# Patient Record
Sex: FEMALE | Race: BLACK OR AFRICAN AMERICAN | NOT HISPANIC OR LATINO
[De-identification: names, ages, dates, MRNs, and addresses within clinical notes are randomized per-mention and may not be internally consistent; named-entity substitution may affect disease eponyms.]

---

## 2021-02-27 ENCOUNTER — TRANSCRIPTION ENCOUNTER (OUTPATIENT)
Age: 66
End: 2021-02-27

## 2021-02-27 ENCOUNTER — INPATIENT (INPATIENT)
Facility: HOSPITAL | Age: 66
LOS: 10 days | Discharge: HOME | DRG: 177 | End: 2021-03-10
Attending: FAMILY MEDICINE | Admitting: HOSPITALIST
Payer: MEDICARE

## 2021-02-27 VITALS
WEIGHT: 179.9 LBS | OXYGEN SATURATION: 93 % | TEMPERATURE: 102 F | RESPIRATION RATE: 20 BRPM | HEART RATE: 106 BPM | HEIGHT: 65 IN | DIASTOLIC BLOOD PRESSURE: 101 MMHG | SYSTOLIC BLOOD PRESSURE: 189 MMHG

## 2021-02-27 DIAGNOSIS — R50.9 FEVER, UNSPECIFIED: ICD-10-CM

## 2021-02-27 LAB
ALBUMIN SERPL ELPH-MCNC: 3.7 G/DL — SIGNIFICANT CHANGE UP (ref 3.3–5.2)
ALP SERPL-CCNC: 65 U/L — SIGNIFICANT CHANGE UP (ref 40–120)
ALT FLD-CCNC: 62 U/L — HIGH
ANION GAP SERPL CALC-SCNC: 2 MMOL/L — LOW (ref 5–17)
APPEARANCE UR: CLEAR — SIGNIFICANT CHANGE UP
AST SERPL-CCNC: 52 U/L — HIGH
B PERT DNA SPEC QL NAA+PROBE: SIGNIFICANT CHANGE UP
BACTERIA # UR AUTO: ABNORMAL
BASOPHILS # BLD AUTO: 0 K/UL — SIGNIFICANT CHANGE UP (ref 0–0.2)
BASOPHILS NFR BLD AUTO: 0 % — SIGNIFICANT CHANGE UP (ref 0–2)
BILIRUB SERPL-MCNC: 0.3 MG/DL — LOW (ref 0.4–2)
BILIRUB UR-MCNC: NEGATIVE — SIGNIFICANT CHANGE UP
BUN SERPL-MCNC: 9 MG/DL — SIGNIFICANT CHANGE UP (ref 8–20)
BURR CELLS BLD QL SMEAR: PRESENT — SIGNIFICANT CHANGE UP
C PNEUM DNA SPEC QL NAA+PROBE: SIGNIFICANT CHANGE UP
CALCIUM SERPL-MCNC: 8.9 MG/DL — SIGNIFICANT CHANGE UP (ref 8.6–10.2)
CHLORIDE SERPL-SCNC: 106 MMOL/L — SIGNIFICANT CHANGE UP (ref 98–107)
CO2 SERPL-SCNC: 22 MMOL/L — SIGNIFICANT CHANGE UP (ref 22–29)
COLOR SPEC: YELLOW — SIGNIFICANT CHANGE UP
CREAT SERPL-MCNC: 0.86 MG/DL — SIGNIFICANT CHANGE UP (ref 0.5–1.3)
CRP SERPL-MCNC: 10.71 MG/DL — HIGH (ref 0–0.4)
D DIMER BLD IA.RAPID-MCNC: 165 NG/ML DDU — SIGNIFICANT CHANGE UP
DACRYOCYTES BLD QL SMEAR: SIGNIFICANT CHANGE UP
DIFF PNL FLD: ABNORMAL
ELLIPTOCYTES BLD QL SMEAR: SLIGHT — SIGNIFICANT CHANGE UP
EOSINOPHIL # BLD AUTO: 0 K/UL — SIGNIFICANT CHANGE UP (ref 0–0.5)
EOSINOPHIL NFR BLD AUTO: 0 % — SIGNIFICANT CHANGE UP (ref 0–6)
EPI CELLS # UR: ABNORMAL
FERRITIN SERPL-MCNC: 298 NG/ML — HIGH (ref 15–150)
FLUAV H1 2009 PAND RNA SPEC QL NAA+PROBE: SIGNIFICANT CHANGE UP
FLUAV H1 RNA SPEC QL NAA+PROBE: SIGNIFICANT CHANGE UP
FLUAV H3 RNA SPEC QL NAA+PROBE: SIGNIFICANT CHANGE UP
FLUAV SUBTYP SPEC NAA+PROBE: SIGNIFICANT CHANGE UP
FLUBV RNA SPEC QL NAA+PROBE: SIGNIFICANT CHANGE UP
GLUCOSE SERPL-MCNC: 169 MG/DL — HIGH (ref 70–99)
GLUCOSE UR QL: NEGATIVE MG/DL — SIGNIFICANT CHANGE UP
GRAN CASTS # UR COMP ASSIST: ABNORMAL /LPF
HADV DNA SPEC QL NAA+PROBE: SIGNIFICANT CHANGE UP
HCOV PNL SPEC NAA+PROBE: SIGNIFICANT CHANGE UP
HCT VFR BLD CALC: 39.5 % — SIGNIFICANT CHANGE UP (ref 34.5–45)
HGB BLD-MCNC: 12.9 G/DL — SIGNIFICANT CHANGE UP (ref 11.5–15.5)
HMPV RNA SPEC QL NAA+PROBE: SIGNIFICANT CHANGE UP
HPIV1 RNA SPEC QL NAA+PROBE: SIGNIFICANT CHANGE UP
HPIV2 RNA SPEC QL NAA+PROBE: SIGNIFICANT CHANGE UP
HPIV3 RNA SPEC QL NAA+PROBE: SIGNIFICANT CHANGE UP
HPIV4 RNA SPEC QL NAA+PROBE: SIGNIFICANT CHANGE UP
HYALINE CASTS # UR AUTO: ABNORMAL /LPF
KETONES UR-MCNC: ABNORMAL
LEUKOCYTE ESTERASE UR-ACNC: ABNORMAL
LYMPHOCYTES # BLD AUTO: 0.58 K/UL — LOW (ref 1–3.3)
LYMPHOCYTES # BLD AUTO: 9 % — LOW (ref 13–44)
MANUAL SMEAR VERIFICATION: SIGNIFICANT CHANGE UP
MCHC RBC-ENTMCNC: 26.1 PG — LOW (ref 27–34)
MCHC RBC-ENTMCNC: 32.7 GM/DL — SIGNIFICANT CHANGE UP (ref 32–36)
MCV RBC AUTO: 80 FL — SIGNIFICANT CHANGE UP (ref 80–100)
METAMYELOCYTES # FLD: 0.9 % — HIGH (ref 0–0)
MONOCYTES # BLD AUTO: 0.23 K/UL — SIGNIFICANT CHANGE UP (ref 0–0.9)
MONOCYTES NFR BLD AUTO: 3.6 % — SIGNIFICANT CHANGE UP (ref 2–14)
MYELOCYTES NFR BLD: 0.9 % — HIGH (ref 0–0)
NEUTROPHILS # BLD AUTO: 5.5 K/UL — SIGNIFICANT CHANGE UP (ref 1.8–7.4)
NEUTROPHILS NFR BLD AUTO: 82.9 % — HIGH (ref 43–77)
NEUTS BAND # BLD: 2.7 % — SIGNIFICANT CHANGE UP (ref 0–8)
NITRITE UR-MCNC: NEGATIVE — SIGNIFICANT CHANGE UP
OVALOCYTES BLD QL SMEAR: SLIGHT — SIGNIFICANT CHANGE UP
PH UR: 5 — SIGNIFICANT CHANGE UP (ref 5–8)
PLAT MORPH BLD: NORMAL — SIGNIFICANT CHANGE UP
PLATELET # BLD AUTO: 218 K/UL — SIGNIFICANT CHANGE UP (ref 150–400)
POIKILOCYTOSIS BLD QL AUTO: SIGNIFICANT CHANGE UP
POLYCHROMASIA BLD QL SMEAR: SLIGHT — SIGNIFICANT CHANGE UP
POTASSIUM SERPL-MCNC: 3.7 MMOL/L — SIGNIFICANT CHANGE UP (ref 3.5–5.3)
POTASSIUM SERPL-SCNC: 3.7 MMOL/L — SIGNIFICANT CHANGE UP (ref 3.5–5.3)
PROCALCITONIN SERPL-MCNC: 0.36 NG/ML — HIGH (ref 0.02–0.1)
PROT SERPL-MCNC: 7.7 G/DL — SIGNIFICANT CHANGE UP (ref 6.6–8.7)
PROT UR-MCNC: 500 MG/DL
RAPID RVP RESULT: DETECTED
RBC # BLD: 4.94 M/UL — SIGNIFICANT CHANGE UP (ref 3.8–5.2)
RBC # FLD: 13.7 % — SIGNIFICANT CHANGE UP (ref 10.3–14.5)
RBC BLD AUTO: ABNORMAL
RBC CASTS # UR COMP ASSIST: ABNORMAL /HPF (ref 0–4)
RV+EV RNA SPEC QL NAA+PROBE: SIGNIFICANT CHANGE UP
SARS-COV-2 RNA SPEC QL NAA+PROBE: DETECTED
SMUDGE CELLS # BLD: PRESENT — SIGNIFICANT CHANGE UP
SODIUM SERPL-SCNC: 129 MMOL/L — LOW (ref 135–145)
SP GR SPEC: 1.01 — SIGNIFICANT CHANGE UP (ref 1.01–1.02)
TROPONIN T SERPL-MCNC: <0.01 NG/ML — SIGNIFICANT CHANGE UP (ref 0–0.06)
UROBILINOGEN FLD QL: NEGATIVE MG/DL — SIGNIFICANT CHANGE UP
WBC # BLD: 6.42 K/UL — SIGNIFICANT CHANGE UP (ref 3.8–10.5)
WBC # FLD AUTO: 6.42 K/UL — SIGNIFICANT CHANGE UP (ref 3.8–10.5)
WBC UR QL: ABNORMAL

## 2021-02-27 PROCEDURE — 93010 ELECTROCARDIOGRAM REPORT: CPT

## 2021-02-27 PROCEDURE — 71045 X-RAY EXAM CHEST 1 VIEW: CPT | Mod: 26

## 2021-02-27 PROCEDURE — 99223 1ST HOSP IP/OBS HIGH 75: CPT | Mod: CS

## 2021-02-27 PROCEDURE — 99285 EMERGENCY DEPT VISIT HI MDM: CPT | Mod: CS,GC

## 2021-02-27 RX ORDER — SODIUM CHLORIDE 9 MG/ML
2500 INJECTION INTRAMUSCULAR; INTRAVENOUS; SUBCUTANEOUS ONCE
Refills: 0 | Status: DISCONTINUED | OUTPATIENT
Start: 2021-02-27 | End: 2021-02-27

## 2021-02-27 RX ORDER — ACETAMINOPHEN 500 MG
650 TABLET ORAL ONCE
Refills: 0 | Status: COMPLETED | OUTPATIENT
Start: 2021-02-27 | End: 2021-02-27

## 2021-02-27 RX ORDER — SODIUM CHLORIDE 9 MG/ML
1000 INJECTION, SOLUTION INTRAVENOUS ONCE
Refills: 0 | Status: COMPLETED | OUTPATIENT
Start: 2021-02-27 | End: 2021-02-27

## 2021-02-27 RX ORDER — CEPHALEXIN 500 MG
1 CAPSULE ORAL
Qty: 21 | Refills: 0
Start: 2021-02-27 | End: 2021-03-05

## 2021-02-27 RX ORDER — SODIUM CHLORIDE 9 MG/ML
1000 INJECTION INTRAMUSCULAR; INTRAVENOUS; SUBCUTANEOUS ONCE
Refills: 0 | Status: COMPLETED | OUTPATIENT
Start: 2021-02-27 | End: 2021-02-27

## 2021-02-27 RX ORDER — ALBUTEROL 90 UG/1
2 AEROSOL, METERED ORAL
Qty: 1 | Refills: 0
Start: 2021-02-27 | End: 2021-03-06

## 2021-02-27 RX ADMIN — SODIUM CHLORIDE 1000 MILLILITER(S): 9 INJECTION, SOLUTION INTRAVENOUS at 18:06

## 2021-02-27 RX ADMIN — Medication 650 MILLIGRAM(S): at 18:00

## 2021-02-27 RX ADMIN — SODIUM CHLORIDE 1000 MILLILITER(S): 9 INJECTION, SOLUTION INTRAVENOUS at 19:34

## 2021-02-27 RX ADMIN — SODIUM CHLORIDE 1000 MILLILITER(S): 9 INJECTION INTRAMUSCULAR; INTRAVENOUS; SUBCUTANEOUS at 21:19

## 2021-02-27 NOTE — ED ADULT NURSE NOTE - OBJECTIVE STATEMENT
pt alert and oriented x3 comes in c/o flu like symptoms, decreased PO intake x4-5 days with body aches. pt states  covid+. RR even unlabored. pt denies chest pain, 94% oNRA. pt educated on plan of care, pt able to successfully teach back plan of care to RN, RN will continue to reeducate pt during hospital stay.

## 2021-02-27 NOTE — H&P ADULT - HISTORY OF PRESENT ILLNESS
65F hx HTN, prediabetes p/w fever, cough and body aches. Patient states started feeling sick 5 days ago. She's had body aches, fevers, headache and generalized weakness. She's also had decreased po intake. Her  was admitted here yesterday for covid19. She denies sob, nausea, vomiting, diarrhea, chest pain or abdominal pain. Patient denies dysuria or urinary frequency.     In ER, pt tmax 100, pulse 88 bp 182, 85, RR- 20 and spo2 initially 95 on room air. Patient was given 2L in bolus fluid. Initially planning to be discharged, but then hypoxia worsned to 92 at rest and 91 with ambulation.

## 2021-02-27 NOTE — ED ADULT NURSE REASSESSMENT NOTE - NS ED NURSE REASSESS COMMENT FT1
Report received from YRN Hardwick at 1915, pt care assumed at that time. Pt received awake, A&Ox4, respirations appearing even, unlabored, pt denies SOB or chest pain. Peripheral pulses palpable with capillary refill <3 seconds. Cardiac monitor in place showing NSR HR 80s,  in place showing Pulse ox > 93% on room air. Pt reporting body aches, offering no other complaints at this time. 20g PIV in L AC with dressing CDI. Pt with no apparent acute distress observed at this time, safety maintained, will continue to monitor.

## 2021-02-27 NOTE — ED PROVIDER NOTE - NS ED ROS FT
General: +fever, no massive bleeding  Head: No HA, no ongoing scalp bleed  ENT: no neck pain, no sore throat  Resp: No SOB, no hemoptysis  Cardiovascular: No CP, No LOC  GI: No abdominal pain, No blood in stool  : No dysuria, no hematuria   MSK: No back pain, +limb pain  Skin: No painful or bleeding lesions  Neuro: No paresthesias, No focal deficits

## 2021-02-27 NOTE — ED ADULT TRIAGE NOTE - CHIEF COMPLAINT QUOTE
pt son reports pt with trouble eating and pain to entire body x5 days.  tested + yesterday for covid

## 2021-02-27 NOTE — H&P ADULT - NSHPREVIEWOFSYSTEMS_GEN_ALL_CORE
REVIEW OF SYSTEMS:    CONSTITUTIONAL: see HPI  EYES/ENT: No visual changes;  No vertigo or throat pain   NECK: No pain or stiffness  RESPIRATORY: No cough, wheezing, hemoptysis; No shortness of breath  CARDIOVASCULAR: No chest pain or palpitations  GASTROINTESTINAL: No abdominal or epigastric pain. No nausea, vomiting, or hematemesis; No diarrhea or constipation. No melena or hematochezia. +anorexia  GENITOURINARY: No dysuria, frequency or hematuria  NEUROLOGICAL: No numbness or weakness  SKIN: No itching, burning, rashes, or lesions   All other review of systems is negative unless indicated above.

## 2021-02-27 NOTE — H&P ADULT - NSHPPHYSICALEXAM_GEN_ALL_CORE
Vital Signs Last 24 Hrs  T(C): 37.8 (27 Feb 2021 22:14), Max: 39 (27 Feb 2021 16:35)  T(F): 100 (27 Feb 2021 22:14), Max: 102.2 (27 Feb 2021 16:35)  HR: 88 (28 Feb 2021 00:04) (86 - 106)  BP: 186/95 (28 Feb 2021 00:04) (181/88 - 189/101)  BP(mean): --  RR: 20 (27 Feb 2021 22:14) (20 - 20)  SpO2: 92% (27 Feb 2021 22:14) (92% - 95%)    GENERAL: NAD, appears stated age  HEENT:  Atraumatic, Normocephalic, MMM, no oropharyngeal lesions  EYES: EOMI, PERRLA, conjunctiva and sclera clear  NECK: Supple, No JVD, no throat tenderness.  CHEST/LUNG: rales at lung bases, no wheeze or rhonchi  HEART: Regular rate and rhythm; No murmurs, rubs, or gallops  ABDOMEN: Soft, Nontender, Nondistended; Bowel sounds present  EXTREMITIES:  2+ Peripheral Pulses, No clubbing, cyanosis, or edema  PSYCH: AAOx3, normal affect  NEUROLOGY: moves all extremities spontaneously. no sensory deficits  SKIN: No rashes or lesions

## 2021-02-27 NOTE — ED ADULT NURSE NOTE - CHIEF COMPLAINT QUOTE
pt son reports pt with trouble eating and pain to entire body x5 days.  tested + yesterday for covid
No

## 2021-02-27 NOTE — ED PROVIDER NOTE - ATTENDING CONTRIBUTION TO CARE
I, Tai Costa, performed a face to face bedside interview with this patient regarding history of present illness, review of symptoms and relevant past medical, social and family history.  I completed an independent physical examination. I have communicated the patient’s plan of care and disposition with the resident.  65 year old female presents with 5 doran of fever and diffuse body aches. She denies cough, SOB, chest pain, abd pain, nausea, vomiting, congestion, headache, neck stiffness, dysuria.   Gen: NAD, well appearing  CV: RRR  Pul: CTA b/l  Abd: Soft, non-distended, non-tender  Neuro: no focal deficits  Pt improved, well appearing and states hat she feels much better, equivocal uti which we will tx, suspect covid, stable for dc and advised home quaratine I, Tai Costa, performed a face to face bedside interview with this patient regarding history of present illness, review of symptoms and relevant past medical, social and family history.  I completed an independent physical examination. I have communicated the patient’s plan of care and disposition with the resident.  65 year old female presents with 5 doran of fever and diffuse body aches. She denies cough, SOB, chest pain, abd pain, nausea, vomiting, congestion, headache, neck stiffness, dysuria.   Gen: NAD, well appearing  CV: RRR  Pul: CTA b/l  Abd: Soft, non-distended, non-tender  Neuro: no focal deficits  Pt with covid infection. States she was feeling better but pulse ox has been continuously dropping while here in dept, 92% on RA and 91% with ambulation. Will admit.

## 2021-02-27 NOTE — ED PROVIDER NOTE - CARE PLAN
Principal Discharge DX:	Febrile illness  Secondary Diagnosis:	Suspected 2019-nCoV infection  Secondary Diagnosis:	Acute cystitis without hematuria

## 2021-02-27 NOTE — ED ADULT NURSE NOTE - NSIMPLEMENTINTERV_GEN_ALL_ED
Implemented All Fall Risk Interventions:  [place] to call system. Call bell, personal items and telephone within reach. Instruct patient to call for assistance. Room bathroom lighting operational. Non-slip footwear when patient is off stretcher. Physically safe environment: no spills, clutter or unnecessary equipment. Stretcher in lowest position, wheels locked, appropriate side rails in place. Provide visual cue, wrist band, yellow gown, etc. Monitor gait and stability. Monitor for mental status changes and reorient to person, place, and time. Review medications for side effects contributing to fall risk. Reinforce activity limits and safety measures with patient and family.

## 2021-02-27 NOTE — H&P ADULT - NSICDXFAMILYHX_GEN_ALL_CORE_FT
Called patient to discuss pelvic US.   Patient was scheduled for pelvic MRI, patient responsible for $1000 deductible. She does not need pelvic US also. Patient scheduled to see me on August 28th at 3:30 p.m. to discuss results and to make management decisions.   No pertinent family history in first degree relatives

## 2021-02-27 NOTE — ED PROVIDER NOTE - PATIENT PORTAL LINK FT
You can access the FollowMyHealth Patient Portal offered by [facility] by registering at the following website: [email or web address]. By joining [employer]’s FollowMyHealth portal, you will also be able to view your health information using other applications (apps) compatible with our system.

## 2021-02-27 NOTE — ED PROVIDER NOTE - PROGRESS NOTE DETAILS
Patient takes amlodipine 10mg at home for BP. Patient found to be COVID +, O2 sat went down to 92-91 on RA.  Decision was made to admit patient for further monitoring and management of COVID.

## 2021-02-27 NOTE — H&P ADULT - ASSESSMENT
65F hx HTN, prediabetes p/w fever, cough and body aches found to be hypoxic 2/2 covid19. 65F hx HTN, prediabetes p/w fever, cough and body aches found to be hypoxic 2/2 covid19.      #Hypoxia 2/2 covid19  -will start on dexamethasone 6 mg iv  -admit to monitored bed with   -likely component of fluids given in ER with high blood pressure  -bp control  -will give lasix 20 x1  -monitor i's and o's  -if hypoxia worsens consider remdesivir  -unlikely PE given normal d-dimer    #Hyponatremia  -likely 2/2 decreased po intake  -s/p 2L in ER  -will check repeat in am, goal correction 8-10 meq in 24 hours  -hold off on further fluids for now    #Abnormal UA  -likely contaminant given moderate epithelials  -patient denies urinary symptoms  -follow up urine culture  -hold off on abx    #Hypertension  -pt hypertensive in ER, will give labetalol 10 mg iv x1  -c/w amlodipine 10 mg daily  -monitor bp, consider adding 2nd agent if remains elevated tmoorrow    #DVT ppx- pt high risk given covid, will start on lovenox 40 mg qd    #goc- full code

## 2021-02-27 NOTE — ED PROVIDER NOTE - PHYSICAL EXAMINATION
General: NAD, ill appearing  HEENT: Normocephalic, EOM intact  Neck: No apparent stiffness or JVD  Pulm: Chest wall symmetric and nontender, lungs clear to ascultation   Cardiac: Regular rate and regular rhythm  Abdomen: Nontender and nondistended  Skin: Skin in warm, dry and intact without rashes or lesions.  Neuro: No apparent motor or sensory deficits  Psych: Alert, oriented, and cooperative

## 2021-02-27 NOTE — H&P ADULT - NSHPLABSRESULTS_GEN_ALL_CORE
12.9   6.42  )-----------( 218      ( 2021 18:39 )             39.5       129<L>  |  106  |  9.0  ----------------------------<  169<H>  3.7   |  22.0  |  0.86    Ca    8.9      2021 18:39    TPro  7.7  /  Alb  3.7  /  TBili  0.3<L>  /  DBili  x   /  AST  52<H>  /  ALT  62<H>  /  AlkPhos  65           Urinalysis Basic - ( 2021 20:30 )    Color: Yellow / Appearance: Clear / S.015 / pH: x  Gluc: x / Ketone: Moderate  / Bili: Negative / Urobili: Negative mg/dL   Blood: x / Protein: 500 mg/dL / Nitrite: Negative   Leuk Esterase: Small / RBC: 3-5 /HPF / WBC 6-10   Sq Epi: x / Non Sq Epi: Moderate / Bacteria: Few    Lactate Trend    CARDIAC MARKERS ( 2021 18:39 )  x     / <0.01 ng/mL / x     / x     / x        CAPILLARY BLOOD GLUCOSE    RADIOLOGY, EKG & ADDITIONAL TESTS: Reviewed.     cxr- b/l patchy infiltrates

## 2021-02-28 ENCOUNTER — TRANSCRIPTION ENCOUNTER (OUTPATIENT)
Age: 66
End: 2021-02-28

## 2021-02-28 LAB
A1C WITH ESTIMATED AVERAGE GLUCOSE RESULT: 7.7 % — HIGH (ref 4–5.6)
ALBUMIN SERPL ELPH-MCNC: 3.3 G/DL — SIGNIFICANT CHANGE UP (ref 3.3–5.2)
ALP SERPL-CCNC: 58 U/L — SIGNIFICANT CHANGE UP (ref 40–120)
ALT FLD-CCNC: 49 U/L — HIGH
ANION GAP SERPL CALC-SCNC: 19 MMOL/L — HIGH (ref 5–17)
ANION GAP SERPL CALC-SCNC: 19 MMOL/L — HIGH (ref 5–17)
AST SERPL-CCNC: 41 U/L — HIGH
BASE EXCESS BLDA CALC-SCNC: -0.6 MMOL/L — SIGNIFICANT CHANGE UP (ref -2–2)
BASE EXCESS BLDV CALC-SCNC: -1 MMOL/L — SIGNIFICANT CHANGE UP (ref -2–2)
BILIRUB SERPL-MCNC: 0.2 MG/DL — LOW (ref 0.4–2)
BUN SERPL-MCNC: 12 MG/DL — SIGNIFICANT CHANGE UP (ref 8–20)
BUN SERPL-MCNC: 16 MG/DL — SIGNIFICANT CHANGE UP (ref 8–20)
CALCIUM SERPL-MCNC: 8.3 MG/DL — LOW (ref 8.6–10.2)
CALCIUM SERPL-MCNC: 8.4 MG/DL — LOW (ref 8.6–10.2)
CHLORIDE SERPL-SCNC: 101 MMOL/L — SIGNIFICANT CHANGE UP (ref 98–107)
CHLORIDE SERPL-SCNC: 98 MMOL/L — SIGNIFICANT CHANGE UP (ref 98–107)
CO2 SERPL-SCNC: 17 MMOL/L — LOW (ref 22–29)
CO2 SERPL-SCNC: 18 MMOL/L — LOW (ref 22–29)
CREAT SERPL-MCNC: 0.82 MG/DL — SIGNIFICANT CHANGE UP (ref 0.5–1.3)
CREAT SERPL-MCNC: 1.05 MG/DL — SIGNIFICANT CHANGE UP (ref 0.5–1.3)
ESTIMATED AVERAGE GLUCOSE: 174 MG/DL — HIGH (ref 68–114)
GAS PNL BLDA: SIGNIFICANT CHANGE UP
GAS PNL BLDV: SIGNIFICANT CHANGE UP
GLUCOSE BLDC GLUCOMTR-MCNC: 250 MG/DL — HIGH (ref 70–99)
GLUCOSE BLDC GLUCOMTR-MCNC: 263 MG/DL — HIGH (ref 70–99)
GLUCOSE BLDC GLUCOMTR-MCNC: 353 MG/DL — HIGH (ref 70–99)
GLUCOSE SERPL-MCNC: 254 MG/DL — HIGH (ref 70–99)
GLUCOSE SERPL-MCNC: 327 MG/DL — HIGH (ref 70–99)
HCO3 BLDA-SCNC: 24 MMOL/L — SIGNIFICANT CHANGE UP (ref 21–29)
HCO3 BLDV-SCNC: 24 MMOL/L — SIGNIFICANT CHANGE UP (ref 21–29)
HCT VFR BLD CALC: 38.1 % — SIGNIFICANT CHANGE UP (ref 34.5–45)
HCV AB S/CO SERPL IA: 0.09 S/CO — SIGNIFICANT CHANGE UP (ref 0–0.99)
HCV AB SERPL-IMP: SIGNIFICANT CHANGE UP
HGB BLD-MCNC: 12.2 G/DL — SIGNIFICANT CHANGE UP (ref 11.5–15.5)
HOROWITZ INDEX BLDA+IHG-RTO: SIGNIFICANT CHANGE UP
LACTATE SERPL-SCNC: 2 MMOL/L — SIGNIFICANT CHANGE UP (ref 0.5–2)
MAGNESIUM SERPL-MCNC: 1.6 MG/DL — SIGNIFICANT CHANGE UP (ref 1.6–2.6)
MCHC RBC-ENTMCNC: 25.5 PG — LOW (ref 27–34)
MCHC RBC-ENTMCNC: 32 GM/DL — SIGNIFICANT CHANGE UP (ref 32–36)
MCV RBC AUTO: 79.5 FL — LOW (ref 80–100)
PCO2 BLDA: 29 MMHG — LOW (ref 35–45)
PCO2 BLDV: 30 MMHG — LOW (ref 35–50)
PH BLDA: 7.49 — HIGH (ref 7.35–7.45)
PH BLDV: 7.46 — HIGH (ref 7.32–7.43)
PHOSPHATE SERPL-MCNC: 3.6 MG/DL — SIGNIFICANT CHANGE UP (ref 2.4–4.7)
PLATELET # BLD AUTO: 214 K/UL — SIGNIFICANT CHANGE UP (ref 150–400)
PO2 BLDA: 59 MMHG — LOW (ref 83–108)
PO2 BLDV: 87 MMHG — HIGH (ref 25–45)
POTASSIUM SERPL-MCNC: 4 MMOL/L — SIGNIFICANT CHANGE UP (ref 3.5–5.3)
POTASSIUM SERPL-MCNC: 4.3 MMOL/L — SIGNIFICANT CHANGE UP (ref 3.5–5.3)
POTASSIUM SERPL-SCNC: 4 MMOL/L — SIGNIFICANT CHANGE UP (ref 3.5–5.3)
POTASSIUM SERPL-SCNC: 4.3 MMOL/L — SIGNIFICANT CHANGE UP (ref 3.5–5.3)
PROT SERPL-MCNC: 7.1 G/DL — SIGNIFICANT CHANGE UP (ref 6.6–8.7)
RBC # BLD: 4.79 M/UL — SIGNIFICANT CHANGE UP (ref 3.8–5.2)
RBC # FLD: 13.7 % — SIGNIFICANT CHANGE UP (ref 10.3–14.5)
SAO2 % BLDA: 92 % — SIGNIFICANT CHANGE UP (ref 92–96)
SAO2 % BLDV: 97 % — SIGNIFICANT CHANGE UP
SARS-COV-2 IGG SERPL QL IA: NEGATIVE — SIGNIFICANT CHANGE UP
SARS-COV-2 IGM SERPL IA-ACNC: 0.1 INDEX — SIGNIFICANT CHANGE UP
SODIUM SERPL-SCNC: 134 MMOL/L — LOW (ref 135–145)
SODIUM SERPL-SCNC: 138 MMOL/L — SIGNIFICANT CHANGE UP (ref 135–145)
WBC # BLD: 6.18 K/UL — SIGNIFICANT CHANGE UP (ref 3.8–10.5)
WBC # FLD AUTO: 6.18 K/UL — SIGNIFICANT CHANGE UP (ref 3.8–10.5)

## 2021-02-28 PROCEDURE — 99232 SBSQ HOSP IP/OBS MODERATE 35: CPT | Mod: CS

## 2021-02-28 RX ORDER — INSULIN LISPRO 100/ML
VIAL (ML) SUBCUTANEOUS
Refills: 0 | Status: DISCONTINUED | OUTPATIENT
Start: 2021-02-28 | End: 2021-03-10

## 2021-02-28 RX ORDER — FUROSEMIDE 40 MG
20 TABLET ORAL ONCE
Refills: 0 | Status: DISCONTINUED | OUTPATIENT
Start: 2021-02-28 | End: 2021-02-28

## 2021-02-28 RX ORDER — ACETAMINOPHEN 500 MG
2 TABLET ORAL
Qty: 0 | Refills: 0 | DISCHARGE
Start: 2021-02-28

## 2021-02-28 RX ORDER — ENOXAPARIN SODIUM 100 MG/ML
40 INJECTION SUBCUTANEOUS DAILY
Refills: 0 | Status: DISCONTINUED | OUTPATIENT
Start: 2021-02-28 | End: 2021-03-02

## 2021-02-28 RX ORDER — GLUCAGON INJECTION, SOLUTION 0.5 MG/.1ML
1 INJECTION, SOLUTION SUBCUTANEOUS ONCE
Refills: 0 | Status: DISCONTINUED | OUTPATIENT
Start: 2021-02-28 | End: 2021-03-10

## 2021-02-28 RX ORDER — DEXAMETHASONE 0.5 MG/5ML
6 ELIXIR ORAL ONCE
Refills: 0 | Status: COMPLETED | OUTPATIENT
Start: 2021-02-28 | End: 2021-02-28

## 2021-02-28 RX ORDER — INSULIN LISPRO 100/ML
VIAL (ML) SUBCUTANEOUS AT BEDTIME
Refills: 0 | Status: DISCONTINUED | OUTPATIENT
Start: 2021-02-28 | End: 2021-03-10

## 2021-02-28 RX ORDER — LISINOPRIL 2.5 MG/1
1 TABLET ORAL
Qty: 30 | Refills: 0
Start: 2021-02-28 | End: 2021-03-29

## 2021-02-28 RX ORDER — AMLODIPINE BESYLATE 2.5 MG/1
1 TABLET ORAL
Qty: 0 | Refills: 0 | DISCHARGE

## 2021-02-28 RX ORDER — AMLODIPINE BESYLATE 2.5 MG/1
10 TABLET ORAL DAILY
Refills: 0 | Status: DISCONTINUED | OUTPATIENT
Start: 2021-02-28 | End: 2021-03-10

## 2021-02-28 RX ORDER — ACETAMINOPHEN 500 MG
650 TABLET ORAL EVERY 6 HOURS
Refills: 0 | Status: DISCONTINUED | OUTPATIENT
Start: 2021-02-28 | End: 2021-03-10

## 2021-02-28 RX ORDER — DEXTROSE 50 % IN WATER 50 %
12.5 SYRINGE (ML) INTRAVENOUS ONCE
Refills: 0 | Status: DISCONTINUED | OUTPATIENT
Start: 2021-02-28 | End: 2021-03-10

## 2021-02-28 RX ORDER — LABETALOL HCL 100 MG
10 TABLET ORAL ONCE
Refills: 0 | Status: COMPLETED | OUTPATIENT
Start: 2021-02-27 | End: 2021-02-28

## 2021-02-28 RX ORDER — FUROSEMIDE 40 MG
20 TABLET ORAL ONCE
Refills: 0 | Status: COMPLETED | OUTPATIENT
Start: 2021-02-28 | End: 2021-02-28

## 2021-02-28 RX ORDER — ASPIRIN/CALCIUM CARB/MAGNESIUM 324 MG
1 TABLET ORAL
Qty: 30 | Refills: 0
Start: 2021-02-28 | End: 2021-03-29

## 2021-02-28 RX ORDER — SODIUM CHLORIDE 9 MG/ML
1000 INJECTION, SOLUTION INTRAVENOUS
Refills: 0 | Status: DISCONTINUED | OUTPATIENT
Start: 2021-02-28 | End: 2021-03-10

## 2021-02-28 RX ORDER — HYDRALAZINE HCL 50 MG
10 TABLET ORAL ONCE
Refills: 0 | Status: COMPLETED | OUTPATIENT
Start: 2021-02-28 | End: 2021-02-28

## 2021-02-28 RX ORDER — AMLODIPINE BESYLATE 2.5 MG/1
1 TABLET ORAL
Qty: 30 | Refills: 0
Start: 2021-02-28 | End: 2021-03-29

## 2021-02-28 RX ORDER — CAPTOPRIL 12.5 MG/1
25 TABLET ORAL EVERY 8 HOURS
Refills: 0 | Status: DISCONTINUED | OUTPATIENT
Start: 2021-02-28 | End: 2021-03-02

## 2021-02-28 RX ORDER — DEXTROSE 50 % IN WATER 50 %
15 SYRINGE (ML) INTRAVENOUS ONCE
Refills: 0 | Status: DISCONTINUED | OUTPATIENT
Start: 2021-02-28 | End: 2021-03-10

## 2021-02-28 RX ORDER — DEXTROSE 50 % IN WATER 50 %
25 SYRINGE (ML) INTRAVENOUS ONCE
Refills: 0 | Status: DISCONTINUED | OUTPATIENT
Start: 2021-02-28 | End: 2021-03-10

## 2021-02-28 RX ORDER — MAGNESIUM SULFATE 500 MG/ML
2 VIAL (ML) INJECTION ONCE
Refills: 0 | Status: COMPLETED | OUTPATIENT
Start: 2021-02-28 | End: 2021-02-28

## 2021-02-28 RX ADMIN — Medication 6 MILLIGRAM(S): at 00:53

## 2021-02-28 RX ADMIN — Medication 4: at 18:03

## 2021-02-28 RX ADMIN — ENOXAPARIN SODIUM 40 MILLIGRAM(S): 100 INJECTION SUBCUTANEOUS at 13:00

## 2021-02-28 RX ADMIN — Medication 2: at 21:41

## 2021-02-28 RX ADMIN — AMLODIPINE BESYLATE 10 MILLIGRAM(S): 2.5 TABLET ORAL at 04:03

## 2021-02-28 RX ADMIN — Medication 20 MILLIGRAM(S): at 01:04

## 2021-02-28 RX ADMIN — Medication 650 MILLIGRAM(S): at 01:04

## 2021-02-28 RX ADMIN — Medication 10 MILLIGRAM(S): at 00:07

## 2021-02-28 RX ADMIN — Medication 10 MILLIGRAM(S): at 03:00

## 2021-02-28 RX ADMIN — CAPTOPRIL 25 MILLIGRAM(S): 12.5 TABLET ORAL at 18:03

## 2021-02-28 RX ADMIN — CAPTOPRIL 25 MILLIGRAM(S): 12.5 TABLET ORAL at 05:21

## 2021-02-28 RX ADMIN — Medication 10: at 13:00

## 2021-02-28 RX ADMIN — Medication 50 GRAM(S): at 13:07

## 2021-02-28 RX ADMIN — Medication 1.25 MILLIGRAM(S): at 04:45

## 2021-02-28 RX ADMIN — Medication 10 MILLIGRAM(S): at 21:20

## 2021-02-28 RX ADMIN — Medication 4: at 09:08

## 2021-02-28 NOTE — DISCHARGE NOTE NURSING/CASE MANAGEMENT/SOCIAL WORK - PATIENT PORTAL LINK FT
You can access the FollowMyHealth Patient Portal offered by [facility] by registering at the following website: [email or web address]. By joining [employer]’s FollowMyHealth portal, you will also be able to view your health information using other applications (apps) compatible with our system.

## 2021-02-28 NOTE — DISCHARGE NOTE PROVIDER - HOSPITAL COURSE
65F hx HTN, prediabetes p/w fever, cough and body aches found to be hypoxic 2/2 covid19. Patients saturation was 91% upon ambulation and 92% at rest on admission. Patient has symptoms of fatigue. Patient is not indicated for treatment of covid as she is not hypoxic. Patient was also found to have hypertensive urgency and started on appropriate medications. Patient is stable for discharge and outpatient follow up.       #Covid  -No Hypoxia noted  -admit to monitored bed with   -Dexamethasone/remdesevir is not indicated    #Hypertensive urgency  -bp control  -will give lasix 20 x1  -Norvasc  -Captopril      #Hyponatremia - Improved  -likely 2/2 decreased po intake  -s/p 2L in ER      #Abnormal UA  -likely contaminant given moderate epithelials  -patient denies urinary symptoms  -follow up urine culture  -hold off on abx    #DVT ppx- pt high risk given covid, will start on lovenox 40 mg qd    #goc- full code   65F hx HTN, prediabetes p/w fever, cough and body aches found to be hypoxic 2/2 covid19. Patients saturation was 91% upon ambulation and 92% at rest on admission. Patient has symptoms of fatigue. Patient is not indicated for treatment of covid as she is not hypoxic. Patient was also found to have hypertensive urgency and started on appropriate medications. Patient is stable for discharge and outpatient follow up.       #Covid  -No Hypoxia noted  -admit to monitored bed with   -Dexamethasone/remdesevir is not indicated    #Hypertensive urgency  -bp control  -will give lasix 20 x1  -Norvasc  -Captopril    #Hyponatremia - Improved  -likely 2/2 decreased po intake  -s/p 2L in ER      #Abnormal UA  -likely contaminant given moderate epithelials  -patient denies urinary symptoms  -follow up urine culture  -hold off on abx    #DVT ppx- pt high risk given covid, will start on lovenox 40 mg qd    #goc- full code   65F hx HTN, prediabetes p/w fever, cough and body aches found to be hypoxic 2/2 covid19. Patients saturation was 91% upon ambulation and 92% at rest on admission. Patient has symptoms of fatigue. Patient is not indicated for treatment of covid as she is not hypoxic. Patient was also found to have hypertensive urgency and started on appropriate medications. Patient is stable for discharge and outpatient follow up.       #Covid  -No Hypoxia noted  -Admit to monitored bed with   -Dexamethasone/remdesevir is not indicated as not hypoxic    #Hypertensive urgency  -Now improved  -Meds adjusted  -Captopril to be changed to lisinopril on discharge for once a day dosing  -Continue Norvasc    #Hyponatremia - Improved  -Likely 2/2 decreased po intake  -S/p 2L in ER    #Abnormal UA  -Likely contaminant given moderate epithelials  -Patient denies urinary symptoms  -Hold off on abx             65F hx HTN, diabetes p/w fever, cough and body aches found to be hypoxic 2/2 covid19. Patient was initially placed on non-rebreather and now transitioned to NC. Was given plasma, tocailizumab and remdesvir. Patient was also noted to be in hypertensive urgency and started on medications.     Covid  - On 3L NC, setting 92%   - S/p convalescent plasma x1  - Initiated Tocilizumab 3/3  - C/w remdesivir/decadron  - ID on board  - Chest CT negative for PE and LE US negative for DVT as of 3/2  - Ddimer elevated- on Full dose AC  -repeat CTA neg for PE   - trend inflammatory markers  - C/w Tylenol PRN  - Monitor/  - Supportive care    Hypertensive urgency  - S/p lasix 20mg IV x 1 (2/28)  - c/w HCTZ 25mg daily  - c/w metoprolol tartrate 25 mg BID   - C/w hydralazine 50mg q8 hours for better control  - c/w lisinopril to 10mg QD   - C/w Norvasc 10mg  - Hydralazine 10 mg PRN for SBP >150  - Renal US: Bilaterally increased renal cortical echogenicity compatible with medical renal disease. No hydronephrosis.      Hyponatremia - Resolved  - Received 2L NS in ED  - Likely 2/2 decreased po intake; encouraged PO intake   - Trend BMP    Abnormal UA  - Likely contaminant given moderate epithelials  - Patient denies urinary symptoms  - UCx w/ 10-50K GNR  - Abx not necessary at this time    Diabetes  - Hga1c 7.7  - Not on medication at home per chart  - C/w lantus and admelog  - Monitor accu checks, titrate meds as needed  - Will need glucometer teaching prior to discharge. Will also need to follow up with PMD within 1 week of DC for further mgt   - Consistent carb diet    DVT ppx- Lovenox 40mg QD

## 2021-02-28 NOTE — DISCHARGE NOTE PROVIDER - NSDCCPCAREPLAN_GEN_ALL_CORE_FT
PRINCIPAL DISCHARGE DIAGNOSIS  Diagnosis: Febrile illness  Assessment and Plan of Treatment: Secondary to Covid. Continue to monitor for symptoms.      SECONDARY DISCHARGE DIAGNOSES  Diagnosis: Hypertensive urgency  Assessment and Plan of Treatment: Please take medications as prescribed. Please follow up with your PCP.     PRINCIPAL DISCHARGE DIAGNOSIS  Diagnosis: Febrile illness  Assessment and Plan of Treatment: Secondary to Covid. Continue to monitor for symptoms.      SECONDARY DISCHARGE DIAGNOSES  Diagnosis: Hypertensive urgency  Assessment and Plan of Treatment: Please take medications as prescribed. Captopril was discontinued and you are started on a new medication called Lisinopril. Continue taking norvasc at home dose. Please follow up with your PCP in one week for further management.

## 2021-02-28 NOTE — DISCHARGE NOTE PROVIDER - NSDCMRMEDTOKEN_GEN_ALL_CORE_FT
acetaminophen 325 mg oral tablet: 2 tab(s) orally every 6 hours, As needed, Temp greater or equal to 38C (100.4F), Mild Pain (1 - 3), Moderate Pain (4 - 6)  amLODIPine 10 mg oral tablet: 1 tab(s) orally once a day  Aspir 81 oral delayed release tablet: 1 tab(s) orally once a day   lisinopril 5 mg oral tablet: 1 tab(s) orally once a day    acetaminophen 325 mg oral tablet: 2 tab(s) orally every 6 hours, As needed, Temp greater or equal to 38C (100.4F), Mild Pain (1 - 3), Moderate Pain (4 - 6)  amLODIPine 10 mg oral tablet: 1 tab(s) orally once a day  Aspir 81 oral delayed release tablet: 1 tab(s) orally once a day   dexamethasone 6 mg oral tablet: 1 tab(s) orally once a day  enoxaparin: 40 unit(s) subcutaneous once a day  hydrALAZINE 100 mg oral tablet: 1 tab(s) orally every 8 hours  hydroCHLOROthiazide 25 mg oral tablet: 1 tab(s) orally once a day  insulin glargine: 16 unit(s) subcutaneous once a day (at bedtime)  lisinopril 5 mg oral tablet: 1 tab(s) orally once a day

## 2021-02-28 NOTE — PROGRESS NOTE ADULT - ASSESSMENT
65F hx HTN, prediabetes p/w fever, cough and body aches found to be hypoxic 2/2 covid19.      #Covid  -No Hypoxia noted  -admit to monitored bed with   -Dexamethasone/remdesevir is not indicated    #Hypertensive urgency  -bp control  -will give lasix 20 x1  -Norvasc  -Captopril      #Hyponatremia - Improved  -likely 2/2 decreased po intake  -s/p 2L in ER      #Abnormal UA  -likely contaminant given moderate epithelials  -patient denies urinary symptoms  -follow up urine culture  -hold off on abx    #DVT ppx- pt high risk given covid, will start on lovenox 40 mg qd    #goc- full code    Likely DC home after repeat labs  Spoke to patients son and updated.

## 2021-03-01 LAB
ALBUMIN SERPL ELPH-MCNC: 3.3 G/DL — SIGNIFICANT CHANGE UP (ref 3.3–5.2)
ALP SERPL-CCNC: 63 U/L — SIGNIFICANT CHANGE UP (ref 40–120)
ALT FLD-CCNC: 52 U/L — HIGH
ANION GAP SERPL CALC-SCNC: 17 MMOL/L — SIGNIFICANT CHANGE UP (ref 5–17)
AST SERPL-CCNC: 54 U/L — HIGH
BILIRUB SERPL-MCNC: 0.3 MG/DL — LOW (ref 0.4–2)
BUN SERPL-MCNC: 17 MG/DL — SIGNIFICANT CHANGE UP (ref 8–20)
CALCIUM SERPL-MCNC: 8.6 MG/DL — SIGNIFICANT CHANGE UP (ref 8.6–10.2)
CHLORIDE SERPL-SCNC: 99 MMOL/L — SIGNIFICANT CHANGE UP (ref 98–107)
CO2 SERPL-SCNC: 20 MMOL/L — LOW (ref 22–29)
CREAT SERPL-MCNC: 0.86 MG/DL — SIGNIFICANT CHANGE UP (ref 0.5–1.3)
GLUCOSE BLDC GLUCOMTR-MCNC: 180 MG/DL — HIGH (ref 70–99)
GLUCOSE BLDC GLUCOMTR-MCNC: 229 MG/DL — HIGH (ref 70–99)
GLUCOSE BLDC GLUCOMTR-MCNC: 247 MG/DL — HIGH (ref 70–99)
GLUCOSE BLDC GLUCOMTR-MCNC: 252 MG/DL — HIGH (ref 70–99)
GLUCOSE SERPL-MCNC: 179 MG/DL — HIGH (ref 70–99)
HCT VFR BLD CALC: 38.8 % — SIGNIFICANT CHANGE UP (ref 34.5–45)
HGB BLD-MCNC: 12.7 G/DL — SIGNIFICANT CHANGE UP (ref 11.5–15.5)
MCHC RBC-ENTMCNC: 25.3 PG — LOW (ref 27–34)
MCHC RBC-ENTMCNC: 32.7 GM/DL — SIGNIFICANT CHANGE UP (ref 32–36)
MCV RBC AUTO: 77.4 FL — LOW (ref 80–100)
PLATELET # BLD AUTO: 281 K/UL — SIGNIFICANT CHANGE UP (ref 150–400)
POTASSIUM SERPL-MCNC: 4.1 MMOL/L — SIGNIFICANT CHANGE UP (ref 3.5–5.3)
POTASSIUM SERPL-SCNC: 4.1 MMOL/L — SIGNIFICANT CHANGE UP (ref 3.5–5.3)
PROT SERPL-MCNC: 7.3 G/DL — SIGNIFICANT CHANGE UP (ref 6.6–8.7)
RBC # BLD: 5.01 M/UL — SIGNIFICANT CHANGE UP (ref 3.8–5.2)
RBC # FLD: 13.7 % — SIGNIFICANT CHANGE UP (ref 10.3–14.5)
SODIUM SERPL-SCNC: 136 MMOL/L — SIGNIFICANT CHANGE UP (ref 135–145)
WBC # BLD: 12.08 K/UL — HIGH (ref 3.8–10.5)
WBC # FLD AUTO: 12.08 K/UL — HIGH (ref 3.8–10.5)

## 2021-03-01 PROCEDURE — 99232 SBSQ HOSP IP/OBS MODERATE 35: CPT | Mod: CS

## 2021-03-01 RX ADMIN — Medication 650 MILLIGRAM(S): at 23:00

## 2021-03-01 RX ADMIN — CAPTOPRIL 25 MILLIGRAM(S): 12.5 TABLET ORAL at 08:42

## 2021-03-01 RX ADMIN — Medication 650 MILLIGRAM(S): at 16:55

## 2021-03-01 RX ADMIN — CAPTOPRIL 25 MILLIGRAM(S): 12.5 TABLET ORAL at 14:01

## 2021-03-01 RX ADMIN — Medication 2: at 08:42

## 2021-03-01 RX ADMIN — Medication 6: at 17:39

## 2021-03-01 RX ADMIN — CAPTOPRIL 25 MILLIGRAM(S): 12.5 TABLET ORAL at 00:26

## 2021-03-01 RX ADMIN — CAPTOPRIL 25 MILLIGRAM(S): 12.5 TABLET ORAL at 20:45

## 2021-03-01 RX ADMIN — ENOXAPARIN SODIUM 40 MILLIGRAM(S): 100 INJECTION SUBCUTANEOUS at 14:00

## 2021-03-01 RX ADMIN — AMLODIPINE BESYLATE 10 MILLIGRAM(S): 2.5 TABLET ORAL at 05:09

## 2021-03-01 RX ADMIN — Medication 650 MILLIGRAM(S): at 05:08

## 2021-03-01 NOTE — PROGRESS NOTE ADULT - ASSESSMENT
65F hx HTN, prediabetes p/w fever, cough and body aches found to be hypoxic 2/2 covid19.    #Covid  -Stable  -No Hypoxia noted, on room air at rest or on ambulation  -Dexamethasone/remdesevir is not indicated  -Monitor/ discontinued   -Supportive care    #Hypertensive urgency  -S/p lasix 20mg IV x 1  -Captopril increased to 25mg Q8  -C/w Norvasc  -Titrate meds as needed, may need further titration if SBP remains in 150s    #Hyponatremia   -Improved  -Likely 2/2 decreased po intake  -S/p 2L in ER    #Abnormal UA  -Likely contaminant given moderate epithelials  -Patient denies urinary symptoms  -UCx w/ 10-50K GNR  -Abx not necessary at this time    #DVT ppx- Lovenox 40mg QD    #goc- full code    DISPO: DC home today pending repeat CMP  65F hx HTN, prediabetes p/w fever, cough and body aches found to be hypoxic 2/2 covid19.    #Covid  -Stable  -No Hypoxia noted, on room air at rest or on ambulation  -Dexamethasone/remdesevir is not indicated  -Monitor/ discontinued   -Supportive care    #Hypertensive urgency  -S/p lasix 20mg IV x 1  -Captopril increased to 25mg Q8  -C/w Norvasc  -Titrate meds as needed, may need further titration if SBP remains in 150s    #Hyponatremia   -Improved  -Likely 2/2 decreased po intake  -S/p 2L in ER  -Repeat BMP pending from this am    #Abnormal UA  -Likely contaminant given moderate epithelials  -Patient denies urinary symptoms  -UCx w/ 10-50K GNR  -Abx not necessary at this time    #DVT ppx- Lovenox 40mg QD    #goc- full code    DISPO: DC home today pending repeat CMP  65F hx HTN, prediabetes p/w fever, cough and body aches found to be hypoxic 2/2 covid19.    #Covid  -Stable  -No Hypoxia noted, on room air at rest or on ambulation  -Dexamethasone/remdesevir is not indicated  -Monitor/ discontinued   -Supportive care    #Hypertensive urgency  -S/p lasix 20mg IV x 1  -Captopril to be changed to lisinopril on discharge for once a day dosing  -C/w Norvasc  -Titrate meds as needed, may need further titration if SBP remains in 150s    #Hyponatremia   -Improved  -Likely 2/2 decreased po intake  -S/p 2L in ER  -Repeat BMP pending from this am    #Abnormal UA  -Likely contaminant given moderate epithelials  -Patient denies urinary symptoms  -UCx w/ 10-50K GNR  -Abx not necessary at this time    #DVT ppx- Lovenox 40mg QD    #goc- full code    DISPO: DC home today pending repeat CMP

## 2021-03-02 LAB
-  AMIKACIN: SIGNIFICANT CHANGE UP
-  AMOXICILLIN/CLAVULANIC ACID: SIGNIFICANT CHANGE UP
-  AMPICILLIN/SULBACTAM: SIGNIFICANT CHANGE UP
-  AMPICILLIN: SIGNIFICANT CHANGE UP
-  AZTREONAM: SIGNIFICANT CHANGE UP
-  CEFAZOLIN: SIGNIFICANT CHANGE UP
-  CEFEPIME: SIGNIFICANT CHANGE UP
-  CEFOXITIN: SIGNIFICANT CHANGE UP
-  CEFTRIAXONE: SIGNIFICANT CHANGE UP
-  CIPROFLOXACIN: SIGNIFICANT CHANGE UP
-  ERTAPENEM: SIGNIFICANT CHANGE UP
-  GENTAMICIN: SIGNIFICANT CHANGE UP
-  IMIPENEM: SIGNIFICANT CHANGE UP
-  LEVOFLOXACIN: SIGNIFICANT CHANGE UP
-  MEROPENEM: SIGNIFICANT CHANGE UP
-  NITROFURANTOIN: SIGNIFICANT CHANGE UP
-  PIPERACILLIN/TAZOBACTAM: SIGNIFICANT CHANGE UP
-  TIGECYCLINE: SIGNIFICANT CHANGE UP
-  TOBRAMYCIN: SIGNIFICANT CHANGE UP
-  TRIMETHOPRIM/SULFAMETHOXAZOLE: SIGNIFICANT CHANGE UP
ABO RH CONFIRMATION: SIGNIFICANT CHANGE UP
ALBUMIN SERPL ELPH-MCNC: 2.8 G/DL — LOW (ref 3.3–5.2)
ALBUMIN SERPL ELPH-MCNC: 2.8 G/DL — LOW (ref 3.3–5.2)
ALP SERPL-CCNC: 104 U/L — SIGNIFICANT CHANGE UP (ref 40–120)
ALP SERPL-CCNC: 104 U/L — SIGNIFICANT CHANGE UP (ref 40–120)
ALT FLD-CCNC: 79 U/L — HIGH
ALT FLD-CCNC: 79 U/L — HIGH
ANION GAP SERPL CALC-SCNC: 17 MMOL/L — SIGNIFICANT CHANGE UP (ref 5–17)
AST SERPL-CCNC: 94 U/L — HIGH
AST SERPL-CCNC: 94 U/L — HIGH
BILIRUB DIRECT SERPL-MCNC: 0.4 MG/DL — HIGH (ref 0–0.3)
BILIRUB INDIRECT FLD-MCNC: 0.3 MG/DL — SIGNIFICANT CHANGE UP (ref 0.2–1)
BILIRUB SERPL-MCNC: 0.7 MG/DL — SIGNIFICANT CHANGE UP (ref 0.4–2)
BILIRUB SERPL-MCNC: 0.7 MG/DL — SIGNIFICANT CHANGE UP (ref 0.4–2)
BLD GP AB SCN SERPL QL: SIGNIFICANT CHANGE UP
BUN SERPL-MCNC: 15 MG/DL — SIGNIFICANT CHANGE UP (ref 8–20)
CALCIUM SERPL-MCNC: 8.5 MG/DL — LOW (ref 8.6–10.2)
CHLORIDE SERPL-SCNC: 99 MMOL/L — SIGNIFICANT CHANGE UP (ref 98–107)
CO2 SERPL-SCNC: 20 MMOL/L — LOW (ref 22–29)
CREAT SERPL-MCNC: 1 MG/DL — SIGNIFICANT CHANGE UP (ref 0.5–1.3)
CREAT SERPL-MCNC: 1 MG/DL — SIGNIFICANT CHANGE UP (ref 0.5–1.3)
CULTURE RESULTS: SIGNIFICANT CHANGE UP
D DIMER BLD IA.RAPID-MCNC: 1746 NG/ML DDU — HIGH
FERRITIN SERPL-MCNC: 550 NG/ML — HIGH (ref 15–150)
GAS PNL BLDA: SIGNIFICANT CHANGE UP
GLUCOSE BLDC GLUCOMTR-MCNC: 169 MG/DL — HIGH (ref 70–99)
GLUCOSE BLDC GLUCOMTR-MCNC: 237 MG/DL — HIGH (ref 70–99)
GLUCOSE BLDC GLUCOMTR-MCNC: 238 MG/DL — HIGH (ref 70–99)
GLUCOSE BLDC GLUCOMTR-MCNC: 252 MG/DL — HIGH (ref 70–99)
GLUCOSE BLDC GLUCOMTR-MCNC: 270 MG/DL — HIGH (ref 70–99)
GLUCOSE SERPL-MCNC: 175 MG/DL — HIGH (ref 70–99)
HCT VFR BLD CALC: 38.7 % — SIGNIFICANT CHANGE UP (ref 34.5–45)
HGB BLD-MCNC: 12.7 G/DL — SIGNIFICANT CHANGE UP (ref 11.5–15.5)
INR BLD: 1.15 RATIO — SIGNIFICANT CHANGE UP (ref 0.88–1.16)
LDH SERPL L TO P-CCNC: 533 U/L — HIGH (ref 98–192)
MCHC RBC-ENTMCNC: 25.6 PG — LOW (ref 27–34)
MCHC RBC-ENTMCNC: 32.8 GM/DL — SIGNIFICANT CHANGE UP (ref 32–36)
MCV RBC AUTO: 78 FL — LOW (ref 80–100)
METHOD TYPE: SIGNIFICANT CHANGE UP
ORGANISM # SPEC MICROSCOPIC CNT: SIGNIFICANT CHANGE UP
ORGANISM # SPEC MICROSCOPIC CNT: SIGNIFICANT CHANGE UP
PLATELET # BLD AUTO: 303 K/UL — SIGNIFICANT CHANGE UP (ref 150–400)
POTASSIUM SERPL-MCNC: 3.9 MMOL/L — SIGNIFICANT CHANGE UP (ref 3.5–5.3)
POTASSIUM SERPL-SCNC: 3.9 MMOL/L — SIGNIFICANT CHANGE UP (ref 3.5–5.3)
PROCALCITONIN SERPL-MCNC: 0.79 NG/ML — HIGH (ref 0.02–0.1)
PROT SERPL-MCNC: 7 G/DL — SIGNIFICANT CHANGE UP (ref 6.6–8.7)
PROT SERPL-MCNC: 7 G/DL — SIGNIFICANT CHANGE UP (ref 6.6–8.7)
PROTHROM AB SERPL-ACNC: 13.2 SEC — SIGNIFICANT CHANGE UP (ref 10.6–13.6)
RBC # BLD: 4.96 M/UL — SIGNIFICANT CHANGE UP (ref 3.8–5.2)
RBC # FLD: 13.7 % — SIGNIFICANT CHANGE UP (ref 10.3–14.5)
SODIUM SERPL-SCNC: 136 MMOL/L — SIGNIFICANT CHANGE UP (ref 135–145)
SPECIMEN SOURCE: SIGNIFICANT CHANGE UP
WBC # BLD: 13.02 K/UL — HIGH (ref 3.8–10.5)
WBC # FLD AUTO: 13.02 K/UL — HIGH (ref 3.8–10.5)

## 2021-03-02 PROCEDURE — 99222 1ST HOSP IP/OBS MODERATE 55: CPT | Mod: CS

## 2021-03-02 PROCEDURE — 71275 CT ANGIOGRAPHY CHEST: CPT | Mod: 26

## 2021-03-02 PROCEDURE — 93970 EXTREMITY STUDY: CPT | Mod: 26

## 2021-03-02 PROCEDURE — 99233 SBSQ HOSP IP/OBS HIGH 50: CPT | Mod: CS

## 2021-03-02 PROCEDURE — 71045 X-RAY EXAM CHEST 1 VIEW: CPT | Mod: 26

## 2021-03-02 RX ORDER — REMDESIVIR 5 MG/ML
100 INJECTION INTRAVENOUS EVERY 24 HOURS
Refills: 0 | Status: COMPLETED | OUTPATIENT
Start: 2021-03-03 | End: 2021-03-06

## 2021-03-02 RX ORDER — LISINOPRIL 2.5 MG/1
5 TABLET ORAL DAILY
Refills: 0 | Status: DISCONTINUED | OUTPATIENT
Start: 2021-03-02 | End: 2021-03-02

## 2021-03-02 RX ORDER — HYDRALAZINE HCL 50 MG
10 TABLET ORAL EVERY 6 HOURS
Refills: 0 | Status: DISCONTINUED | OUTPATIENT
Start: 2021-03-02 | End: 2021-03-10

## 2021-03-02 RX ORDER — REMDESIVIR 5 MG/ML
INJECTION INTRAVENOUS
Refills: 0 | Status: COMPLETED | OUTPATIENT
Start: 2021-03-02 | End: 2021-03-06

## 2021-03-02 RX ORDER — DEXAMETHASONE 0.5 MG/5ML
6 ELIXIR ORAL DAILY
Refills: 0 | Status: DISCONTINUED | OUTPATIENT
Start: 2021-03-02 | End: 2021-03-10

## 2021-03-02 RX ORDER — LISINOPRIL 2.5 MG/1
10 TABLET ORAL DAILY
Refills: 0 | Status: DISCONTINUED | OUTPATIENT
Start: 2021-03-02 | End: 2021-03-10

## 2021-03-02 RX ORDER — ENOXAPARIN SODIUM 100 MG/ML
40 INJECTION SUBCUTANEOUS DAILY
Refills: 0 | Status: DISCONTINUED | OUTPATIENT
Start: 2021-03-02 | End: 2021-03-06

## 2021-03-02 RX ORDER — REMDESIVIR 5 MG/ML
200 INJECTION INTRAVENOUS EVERY 24 HOURS
Refills: 0 | Status: COMPLETED | OUTPATIENT
Start: 2021-03-02 | End: 2021-03-02

## 2021-03-02 RX ORDER — ENOXAPARIN SODIUM 100 MG/ML
80 INJECTION SUBCUTANEOUS
Refills: 0 | Status: DISCONTINUED | OUTPATIENT
Start: 2021-03-02 | End: 2021-03-02

## 2021-03-02 RX ADMIN — Medication 2: at 20:52

## 2021-03-02 RX ADMIN — Medication 10 MILLIGRAM(S): at 12:50

## 2021-03-02 RX ADMIN — Medication 4: at 18:00

## 2021-03-02 RX ADMIN — AMLODIPINE BESYLATE 10 MILLIGRAM(S): 2.5 TABLET ORAL at 04:41

## 2021-03-02 RX ADMIN — Medication 4: at 12:57

## 2021-03-02 RX ADMIN — REMDESIVIR 500 MILLIGRAM(S): 5 INJECTION INTRAVENOUS at 10:33

## 2021-03-02 RX ADMIN — Medication 650 MILLIGRAM(S): at 12:35

## 2021-03-02 RX ADMIN — LISINOPRIL 10 MILLIGRAM(S): 2.5 TABLET ORAL at 10:33

## 2021-03-02 RX ADMIN — Medication 2: at 08:38

## 2021-03-02 RX ADMIN — Medication 6 MILLIGRAM(S): at 08:38

## 2021-03-02 RX ADMIN — ENOXAPARIN SODIUM 40 MILLIGRAM(S): 100 INJECTION SUBCUTANEOUS at 17:44

## 2021-03-02 RX ADMIN — CAPTOPRIL 25 MILLIGRAM(S): 12.5 TABLET ORAL at 04:41

## 2021-03-02 RX ADMIN — Medication 10 MILLIGRAM(S): at 18:50

## 2021-03-02 RX ADMIN — Medication 650 MILLIGRAM(S): at 05:00

## 2021-03-02 NOTE — PROVIDER CONTACT NOTE (CHANGE IN STATUS NOTIFICATION) - ACTION/TREATMENT ORDERED:
3L NC in place, pt in prone position comfortable in bed, continuous pulsox monitoring, awaiting for PA to assess the pt

## 2021-03-02 NOTE — CONSULT NOTE ADULT - ASSESSMENT
This 65F hx HTN, prediabetes p/w fever, cough and body aches. Patient states started feeling sick 5 days ago. She's had body aches, fevers, headache and generalized weakness. She's also had decreased po intake. Her  was admitted here yesterday for covid19. She denies sob, nausea, vomiting, diarrhea, chest pain or abdominal pain. Patient denies dysuria or urinary frequency.   In ER, pt tmax 100, pulse 88 bp 182, 85, RR- 20 and spo2 initially 95 on room air. Patient was given 2L in bolus fluid. Initially planning to be discharged, but then hypoxia worsened to 92 at rest and 91 with ambulation.  (27 Feb 2021 23:07)    patient is confirmed with COVID-19.  CXR with right base opacification.   patient's  is also sick with COVID-198    patient has been here for a few days, was well at first, then started on Remdesivir  3/2/2021    Impression:  COVID-19 infection   Viral pneumonia  shortness of breath  lung infiltrates  dependence on oxygen    Plan:  - continue Remdesivir IV daily.  will plan for a 5 day course.  THRU  3/6/21  - MAY stop earlier than 5 days, and send home, if patient is back on Ambient oxygen/ room air.     - continue dexamethasone 6mg daily, While on remdesivir  Inflammatory markers and LFTs WILL BE ORDERED with the REMDESIVIR order SET.  DO NOT ORDER this additionally.   - trend CBC with diff, CMP  daily    - Continue supportive care measures  - continue Oxygenation as needed;  CONTINUE to titrate down as tolerated  - self- proning  as tolerated  - ENCOURAGED OOB to chair  - encouraged incentive spirometry       - COVID-19 antibodies are low / negative  - OFFERED, and patient CONSENTED to Convalescent Plasma as part of EUA  - will give 1 unit of CP.  type and screen and confirmation ordered.   - will monitor patient during and after transfusion of CP        Will follow with you.

## 2021-03-02 NOTE — PROGRESS NOTE ADULT - ASSESSMENT
65F hx HTN, prediabetes p/w fever, cough and body aches found to be hypoxic 2/2 covid19.    #Covid  -Pt was previously stable and not hypoxic. Pt was prepped for discharge yetserday 3/1 however late evening pt started desating and DC was cancelled. Pt was placed on 3L with improvement. This morning, patient now desating on 5L NC to 86% and now requiring NRB. Repeat inflammatory markers show acutely elevated ddimer (1700). Pt now started on full dose lovenox.   - Stat ABG, CTA chest, LE duplex and TTE ordered for work up for new acute hypoxia^  - Dexamethasone and remdesivir started (previously held as pt was not hypoxic)  - Will consult ID  - Monitor/  - Supportive care    #Hypertensive urgency  -S/p lasix 20mg IV x 1  -Captopril to be changed to lisinopril on discharge for once a day dosing  -C/w Norvasc  -Titrate meds as needed, may need further titration if SBP remains in 150s    #Hyponatremia   -Resolved  -Likely 2/2 decreased po intake  -S/p 2L in ER  -Repeat BMP pending from this am    #Abnormal UA  -Likely contaminant given moderate epithelials  -Patient denies urinary symptoms  -UCx w/ 10-50K GNR  -Abx not necessary at this time    #DVT ppx- Full dose lovenox BID    #goc- full code    DISPO: Pending clinical improvement  65F hx HTN, prediabetes p/w fever, cough and body aches found to be hypoxic 2/2 covid19.    #Covid  -Pt was previously stable and not hypoxic. Pt was prepped for discharge yetserday 3/1 however late evening pt started desating and DC was cancelled. Pt was placed on 3L with improvement. This morning, patient now desating on 5L NC to 86% and now requiring NRB. Repeat inflammatory markers show acutely elevated ddimer (1700). Pt now started on full dose lovenox.   - Stat ABG, CTA chest, LE duplex and TTE ordered for work up for new acute hypoxia^  - Dexamethasone and remdesivir started (previously held as pt was not hypoxic)  - Will consult ID  - Pt with elevated temps 2/2 covid. Infectious work up insignificant, blood cultures negative, urine cultures w/ GNR 10-50K. C/w Tylenol PRN  - Monitor/  - Supportive care    #Hypertensive urgency  -S/p lasix 20mg IV x 1  -Will increase lisinopril to 10mg for better control   -C/w Norvasc 10mg  -Titrate meds as needed, may need further titration if SBP remains in 150s  -Captopril to be changed to lisinopril on discharge for once a day dosing    #Hyponatremia   -Resolved  -Likely 2/2 decreased po intake  -S/p 2L in ER    #Abnormal UA  -Likely contaminant given moderate epithelials  -Patient denies urinary symptoms  -UCx w/ 10-50K GNR  -Abx not necessary at this time    #DVT ppx- Full dose lovenox BID    #goc- full code    DISPO: Pending clinical improvement  65F hx HTN, prediabetes p/w fever, cough and body aches found to be hypoxic 2/2 covid19.    #Covid  -Pt was previously stable and not hypoxic. Pt was prepped for discharge yetserday 3/1 however late evening pt started desating and DC was cancelled. Pt was placed on 3L with improvement. This morning, patient now desating on 5L NC to 86% and now requiring NRB. Repeat inflammatory markers show acutely elevated ddimer (1700). Pt now started on full dose lovenox.   - Stat ABG, CTA chest, LE duplex and TTE ordered for work up for new acute hypoxia^  - Dexamethasone and remdesivir started (previously held as pt was not hypoxic)  - Will consult ID  - Pt with elevated temps 2/2 covid. Infectious work up insignificant, blood cultures negative, urine cultures w/ GNR 10-50K. C/w Tylenol PRN  - Monitor/  - Supportive care    #Hypertensive urgency  -S/p lasix 20mg IV x 1  -Will increase lisinopril to 10mg for better control   -C/w Norvasc 10mg  -Titrate meds as needed, may need further titration if SBP remains in 150s  -Captopril to be changed to lisinopril on discharge for once a day dosing    #Hyponatremia   -Resolved  -Likely 2/2 decreased po intake  -S/p 2L in ER    #Abnormal UA  -Likely contaminant given moderate epithelials  -Patient denies urinary symptoms  -UCx w/ 10-50K GNR  -Abx not necessary at this time    #DVT ppx- Full dose lovenox BID    #goc- full code    DISPO: Pending clinical improvement

## 2021-03-02 NOTE — CONSULT NOTE ADULT - SUBJECTIVE AND OBJECTIVE BOX
[facility]  INFECTIOUS DISEASES AND INTERNAL MEDICINE at [place]  =======================================================  Darvin Carter MD  Diplomates American Board of Internal Medicine and Infectious Diseases  Tel  [phone]  Fax [phone]  =======================================================    [id number]  BRADY GBEMI   HPI:  This 65F hx HTN, prediabetes p/w fever, cough and body aches. Patient states started feeling sick 5 days ago. She's had body aches, fevers, headache and generalized weakness. She's also had decreased po intake. Her  was admitted here yesterday for covid19. She denies sob, nausea, vomiting, diarrhea, chest pain or abdominal pain. Patient denies dysuria or urinary frequency.   In ER, pt tmax 100, pulse 88 bp 182, 85, RR- 20 and spo2 initially 95 on room air. Patient was given 2L in bolus fluid. Initially planning to be discharged, but then hypoxia worsened to 92 at rest and 91 with ambulation.  (27 Feb 2021 23:07)    patient is confirmed with COVID-19.  CXR with right base opacification.   patient's  is also sick with COVID-198    patient has been here for a few days, was well at first, then started on Remdesivir  3/2/2021    I have personally reviewed the labs and data; pertinent labs and data are listed in this note; please see below.   =======================================================  Past Medical & Surgical Hx:  =====================  PAST MEDICAL & SURGICAL HISTORY:  Prediabetes  Hypertension  No significant past surgical history      Problem List:  ==========  HEALTH ISSUES - PROBLEM Dx:      Social Hx:  =======  no toxic habits currently    FAMILY HISTORY:  No pertinent family history in first degree relatives    no significant family history of immunosuppressive disorders in mother or father   =======================================================    REVIEW OF SYSTEMS:  CONSTITUTIONAL:  No Fever or chills  HEENT:  No diplopia or blurred vision.  No earache, sore throat or runny nose.  CARDIOVASCULAR:  No pressure, squeezing, strangling, tightness, heaviness or aching about the chest, neck, axilla or epigastrium.  RESPIRATORY: + SOB  GASTROINTESTINAL:  No nausea, vomiting or diarrhea.  GENITOURINARY:  No dysuria, frequency or urgency. No Blood in urine  MUSCULOSKELETAL:  no joint aches, no muscle pain  SKIN:  No change in skin, hair or nails.  NEUROLOGIC:  No Headaches, seizures or weakness.  PSYCHIATRIC:  No disorder of thought or mood.  ENDOCRINE:  No heat or cold intolerance  HEMATOLOGICAL:  No easy bruising or bleeding.    =======================================================  Allergies  No Known Allergies    Antibiotics:  remdesivir  IVPB   IV Intermittent     Other medications:  amLODIPine   Tablet 10 milliGRAM(s) Oral daily  dexAMETHasone     Tablet 6 milliGRAM(s) Oral daily  dextrose 40% Gel 15 Gram(s) Oral once  dextrose 5%. 1000 milliLiter(s) IV Continuous <Continuous>  dextrose 5%. 1000 milliLiter(s) IV Continuous <Continuous>  dextrose 50% Injectable 25 Gram(s) IV Push once  dextrose 50% Injectable 12.5 Gram(s) IV Push once  dextrose 50% Injectable 25 Gram(s) IV Push once  enoxaparin Injectable 80 milliGRAM(s) SubCutaneous two times a day  glucagon  Injectable 1 milliGRAM(s) IntraMuscular once  insulin lispro (ADMELOG) corrective regimen sliding scale   SubCutaneous three times a day before meals  insulin lispro (ADMELOG) corrective regimen sliding scale   SubCutaneous at bedtime  lisinopril 10 milliGRAM(s) Oral daily     remdesivir  IVPB   500 mL/Hr IV Intermittent (03-02-21 @ 10:33)      ======================================================  Physical Exam:  ============  T(F): 100.1 (02 Mar 2021 12:30), Max: 102.8 (01 Mar 2021 16:46)  HR: 93 (02 Mar 2021 14:10)  BP: 151/83 (02 Mar 2021 14:10)  RR: 20 (02 Mar 2021 12:30)  SpO2: 95% (02 Mar 2021 12:30) (85% - 95%)  temp max in last 48H T(F): , Max: 102.8 (03-01-21 @ 16:46)    General:  No acute distress.  OBESE  Eye: Pupils are equal, round and reactive to light, Extraocular movements are intact, Normal conjunctiva.  HENT: Normocephalic, Oral mucosa is moist, No pharyngeal erythema, No sinus tenderness.  Neck: Supple, No lymphadenopathy.  Respiratory: Lungs with poor aeration  at bases  Cardiovascular: Normal rate, Regular rhythm,   Gastrointestinal: Soft, Non-tender, Non-distended, Normal bowel sounds.  Genitourinary: No costovertebral angle tenderness.  Lymphatics: No lymphadenopathy neck,   Musculoskeletal: Normal range of motion, Normal strength.  Integumentary: No rash.  Neurologic: Alert, Oriented, No focal deficits, Cranial Nerves II-XII are grossly intact.  Psychiatric: Appropriate mood & affect.    =======================================================  Labs:                        12.7   13.02 )-----------( 303      ( 02 Mar 2021 07:49 )             38.7     03-02    136  |  99  |  15.0  ----------------------------<  175<H>  3.9   |  20.0<L>  |  1.00    Ca    8.5<L>      02 Mar 2021 07:47    TPro  7.0  /  Alb  2.8<L>  /  TBili  0.7  /  DBili  0.4<H>  /  AST  94<H>  /  ALT  79<H>  /  AlkPhos  104  03-02            Culture - Urine (collected 02-28-21 @ 02:38)  Source: .Urine Clean Catch (Midstream)  Organism: Escherichia coli (03-02-21 @ 12:01)  Organism: Escherichia coli (03-02-21 @ 12:01)    Sensitivities:      -  Amikacin: S <=16      -  Amoxicillin/Clavulanic Acid: S <=8/4      -  Ampicillin: S <=8 These ampicillin results predict results for amoxicillin      -  Ampicillin/Sulbactam: S <=4/2 Enterobacter, Citrobacter, and Serratia may develop resistance during prolonged therapy (3-4 days)      -  Aztreonam: S <=4      -  Cefazolin: S <=2 (MIC_CL_[id number]_[id number]) For uncomplicated UTI with K. pneumoniae, E. coli, or P. mirablis: CHIKI <=16 is sensitive and CHIKI >=32 is resistant. This also predicts results for oral agents cefaclor, cefdinir, cefpodoxime, cefprozil, cefuroxime axetil, cephalexin and locarbef for uncomplicated UTI. Note that some isolates may be susceptible to these agents while testing resistant to cefazolin.      -  Cefepime: S <=2      -  Cefoxitin: S <=8      -  Ceftriaxone: S <=1 Enterobacter, Citrobacter, and Serratia may develop resistance during prolonged therapy      -  Ciprofloxacin: R >2      -  Ertapenem: S <=0.5      -  Gentamicin: S <=2      -  Imipenem: S <=1      -  Levofloxacin: R 4      -  Meropenem: S <=1      -  Nitrofurantoin: S <=32 Should not be used to treat pyelonephritis      -  Piperacillin/Tazobactam: S <=8      -  Tigecycline: S <=2      -  Tobramycin: S <=2      -  Trimethoprim/Sulfamethoxazole: S <=0.5/9.5      Method Type: CHIKI    Culture - Blood (collected 02-27-21 @ 19:16)  Source: .Blood Blood-Peripheral    Culture - Blood (collected 02-27-21 @ 19:16)  Source: .Blood Blood-Peripheral      Creatinine, Serum: 1.00 mg/dL (03-02-21 @ 07:47)  Creatinine, Serum: 1.00 mg/dL (03-02-21 @ 07:47)  Creatinine, Serum: 0.86 mg/dL (03-01-21 @ 09:44)  Creatinine, Serum: 0.82 mg/dL (02-28-21 @ 14:11)  Creatinine, Serum: 1.05 mg/dL (02-28-21 @ 08:20)  Creatinine, Serum: 0.86 mg/dL (02-27-21 @ 18:39)    C-Reactive Protein, Serum: 10.71 mg/dL (02-27-21 @ 18:39)      Procalcitonin, Serum: 0.79 ng/mL (03-02-21 @ 07:47)  Procalcitonin, Serum: 0.36 ng/mL (02-27-21 @ 18:39)    Rapid RVP Result: Detected (02-27-21 @ 19:03)  SARS-CoV-2: Detected (02-27-21 @ 19:03)    COVID-19 IgG Antibody Index: 0.10 Index (02-28-21 @ 12:10)  COVID-19 IgG Antibody Interpretation: Negative (02-28-21 @ 12:10)      < from: Xray Chest 1 View-PORTABLE IMMEDIATE (02.27.21 @ 18:58) >   EXAM:  XR CHEST PORTABLE IMMED 1V                          PROCEDURE DATE:  02/27/2021          INTERPRETATION:  AP chest.    Clinical indications: Shortness of breath.    IMPRESSION: There is minimal right base opacification that may be infectious in nature. Cardiac silhouette is upper limits of normal in size. The left lung is clear.         NICO LEMOS MD; Attending Radiologist  This document has been electronically signed. Feb 28 2021  8:31AM    < end of copied text >

## 2021-03-03 LAB
ALBUMIN SERPL ELPH-MCNC: 2.9 G/DL — LOW (ref 3.3–5.2)
ALBUMIN SERPL ELPH-MCNC: 3 G/DL — LOW (ref 3.3–5.2)
ALP SERPL-CCNC: 103 U/L — SIGNIFICANT CHANGE UP (ref 40–120)
ALP SERPL-CCNC: 98 U/L — SIGNIFICANT CHANGE UP (ref 40–120)
ALT FLD-CCNC: 57 U/L — HIGH
ALT FLD-CCNC: 57 U/L — HIGH
ANION GAP SERPL CALC-SCNC: 14 MMOL/L — SIGNIFICANT CHANGE UP (ref 5–17)
AST SERPL-CCNC: 49 U/L — HIGH
AST SERPL-CCNC: 49 U/L — HIGH
BASOPHILS # BLD AUTO: 0.02 K/UL — SIGNIFICANT CHANGE UP (ref 0–0.2)
BASOPHILS NFR BLD AUTO: 0.2 % — SIGNIFICANT CHANGE UP (ref 0–2)
BILIRUB DIRECT SERPL-MCNC: 0.2 MG/DL — SIGNIFICANT CHANGE UP (ref 0–0.3)
BILIRUB INDIRECT FLD-MCNC: 0.2 MG/DL — SIGNIFICANT CHANGE UP (ref 0.2–1)
BILIRUB SERPL-MCNC: 0.4 MG/DL — SIGNIFICANT CHANGE UP (ref 0.4–2)
BILIRUB SERPL-MCNC: 0.4 MG/DL — SIGNIFICANT CHANGE UP (ref 0.4–2)
BUN SERPL-MCNC: 18 MG/DL — SIGNIFICANT CHANGE UP (ref 8–20)
CALCIUM SERPL-MCNC: 8.5 MG/DL — LOW (ref 8.6–10.2)
CHLORIDE SERPL-SCNC: 98 MMOL/L — SIGNIFICANT CHANGE UP (ref 98–107)
CO2 SERPL-SCNC: 21 MMOL/L — LOW (ref 22–29)
CREAT SERPL-MCNC: 0.96 MG/DL — SIGNIFICANT CHANGE UP (ref 0.5–1.3)
D DIMER BLD IA.RAPID-MCNC: 1814 NG/ML DDU — HIGH
EOSINOPHIL # BLD AUTO: 0.02 K/UL — SIGNIFICANT CHANGE UP (ref 0–0.5)
EOSINOPHIL NFR BLD AUTO: 0.2 % — SIGNIFICANT CHANGE UP (ref 0–6)
FERRITIN SERPL-MCNC: 667 NG/ML — HIGH (ref 15–150)
GLUCOSE BLDC GLUCOMTR-MCNC: 192 MG/DL — HIGH (ref 70–99)
GLUCOSE BLDC GLUCOMTR-MCNC: 245 MG/DL — HIGH (ref 70–99)
GLUCOSE BLDC GLUCOMTR-MCNC: 275 MG/DL — HIGH (ref 70–99)
GLUCOSE BLDC GLUCOMTR-MCNC: 330 MG/DL — HIGH (ref 70–99)
GLUCOSE SERPL-MCNC: 178 MG/DL — HIGH (ref 70–99)
HCT VFR BLD CALC: 36.2 % — SIGNIFICANT CHANGE UP (ref 34.5–45)
HGB BLD-MCNC: 11.7 G/DL — SIGNIFICANT CHANGE UP (ref 11.5–15.5)
IMM GRANULOCYTES NFR BLD AUTO: 1 % — SIGNIFICANT CHANGE UP (ref 0–1.5)
INR BLD: 1.12 RATIO — SIGNIFICANT CHANGE UP (ref 0.88–1.16)
LDH SERPL L TO P-CCNC: 481 U/L — HIGH (ref 98–192)
LYMPHOCYTES # BLD AUTO: 0.79 K/UL — LOW (ref 1–3.3)
LYMPHOCYTES # BLD AUTO: 7.5 % — LOW (ref 13–44)
MCHC RBC-ENTMCNC: 25.3 PG — LOW (ref 27–34)
MCHC RBC-ENTMCNC: 32.3 GM/DL — SIGNIFICANT CHANGE UP (ref 32–36)
MCV RBC AUTO: 78.4 FL — LOW (ref 80–100)
MONOCYTES # BLD AUTO: 0.54 K/UL — SIGNIFICANT CHANGE UP (ref 0–0.9)
MONOCYTES NFR BLD AUTO: 5.1 % — SIGNIFICANT CHANGE UP (ref 2–14)
NEUTROPHILS # BLD AUTO: 9.08 K/UL — HIGH (ref 1.8–7.4)
NEUTROPHILS NFR BLD AUTO: 86 % — HIGH (ref 43–77)
PLATELET # BLD AUTO: 329 K/UL — SIGNIFICANT CHANGE UP (ref 150–400)
POTASSIUM SERPL-MCNC: 4.2 MMOL/L — SIGNIFICANT CHANGE UP (ref 3.5–5.3)
POTASSIUM SERPL-SCNC: 4.2 MMOL/L — SIGNIFICANT CHANGE UP (ref 3.5–5.3)
PROT SERPL-MCNC: 7 G/DL — SIGNIFICANT CHANGE UP (ref 6.6–8.7)
PROT SERPL-MCNC: 7 G/DL — SIGNIFICANT CHANGE UP (ref 6.6–8.7)
PROTHROM AB SERPL-ACNC: 12.9 SEC — SIGNIFICANT CHANGE UP (ref 10.6–13.6)
RBC # BLD: 4.62 M/UL — SIGNIFICANT CHANGE UP (ref 3.8–5.2)
RBC # FLD: 13.8 % — SIGNIFICANT CHANGE UP (ref 10.3–14.5)
SODIUM SERPL-SCNC: 132 MMOL/L — LOW (ref 135–145)
WBC # BLD: 10.56 K/UL — HIGH (ref 3.8–10.5)
WBC # FLD AUTO: 10.56 K/UL — HIGH (ref 3.8–10.5)

## 2021-03-03 PROCEDURE — 99232 SBSQ HOSP IP/OBS MODERATE 35: CPT | Mod: CS

## 2021-03-03 PROCEDURE — 99233 SBSQ HOSP IP/OBS HIGH 50: CPT | Mod: CS

## 2021-03-03 RX ORDER — SODIUM CHLORIDE 9 MG/ML
500 INJECTION INTRAMUSCULAR; INTRAVENOUS; SUBCUTANEOUS ONCE
Refills: 0 | Status: DISCONTINUED | OUTPATIENT
Start: 2021-03-03 | End: 2021-03-03

## 2021-03-03 RX ORDER — TOCILIZUMAB 20 MG/ML
600 INJECTION, SOLUTION, CONCENTRATE INTRAVENOUS ONCE
Refills: 0 | Status: COMPLETED | OUTPATIENT
Start: 2021-03-03 | End: 2021-03-03

## 2021-03-03 RX ORDER — HYDRALAZINE HCL 50 MG
25 TABLET ORAL EVERY 8 HOURS
Refills: 0 | Status: DISCONTINUED | OUTPATIENT
Start: 2021-03-03 | End: 2021-03-04

## 2021-03-03 RX ADMIN — Medication 10 MILLIGRAM(S): at 16:41

## 2021-03-03 RX ADMIN — LISINOPRIL 10 MILLIGRAM(S): 2.5 TABLET ORAL at 04:51

## 2021-03-03 RX ADMIN — Medication 6 MILLIGRAM(S): at 04:51

## 2021-03-03 RX ADMIN — TOCILIZUMAB 100 MILLIGRAM(S): 20 INJECTION, SOLUTION, CONCENTRATE INTRAVENOUS at 13:08

## 2021-03-03 RX ADMIN — Medication 2: at 08:55

## 2021-03-03 RX ADMIN — Medication 4: at 21:45

## 2021-03-03 RX ADMIN — Medication 650 MILLIGRAM(S): at 16:40

## 2021-03-03 RX ADMIN — Medication 10 MILLIGRAM(S): at 06:11

## 2021-03-03 RX ADMIN — AMLODIPINE BESYLATE 10 MILLIGRAM(S): 2.5 TABLET ORAL at 04:51

## 2021-03-03 RX ADMIN — Medication 25 MILLIGRAM(S): at 13:09

## 2021-03-03 RX ADMIN — Medication 25 MILLIGRAM(S): at 21:45

## 2021-03-03 RX ADMIN — REMDESIVIR 500 MILLIGRAM(S): 5 INJECTION INTRAVENOUS at 11:02

## 2021-03-03 RX ADMIN — Medication 6: at 17:45

## 2021-03-03 RX ADMIN — Medication 650 MILLIGRAM(S): at 17:40

## 2021-03-03 RX ADMIN — ENOXAPARIN SODIUM 40 MILLIGRAM(S): 100 INJECTION SUBCUTANEOUS at 11:02

## 2021-03-03 RX ADMIN — Medication 4: at 13:08

## 2021-03-03 NOTE — PROGRESS NOTE ADULT - ASSESSMENT
This 65F hx HTN, prediabetes p/w fever, cough and body aches. Patient states started feeling sick 5 days ago. She's had body aches, fevers, headache and generalized weakness. She's also had decreased po intake. Her  was admitted here yesterday for covid19. She denies sob, nausea, vomiting, diarrhea, chest pain or abdominal pain. Patient denies dysuria or urinary frequency.   In ER, pt tmax 100, pulse 88 bp 182, 85, RR- 20 and spo2 initially 95 on room air. Patient was given 2L in bolus fluid. Initially planning to be discharged, but then hypoxia worsened to 92 at rest and 91 with ambulation.  (27 Feb 2021 23:07)    patient is confirmed with COVID-19.  CXR with right base opacification.   patient's  is also sick with COVID-198    patient has been here for a few days, was well at first, then started on Remdesivir  3/2/2021    Impression:  COVID-19 infection   Viral pneumonia  shortness of breath  lung infiltrates  dependence on oxygen    Plan:  s/p Convalescent plasma 3/2/21    - continue Remdesivir IV daily.  will plan for a 5 day course.  THRU  3/6/21  WILL likely extend    Will order Tocilizumab x 1 dose today     - continue dexamethasone 6mg daily, While on remdesivir  Inflammatory markers and LFTs WILL BE ORDERED with the REMDESIVIR order SET.  DO NOT ORDER this additionally.   - trend CBC with diff, CMP  daily    - Continue supportive care measures  - continue Oxygenation as needed;  CONTINUE to titrate down as tolerated  - self- proning  as tolerated  - ENCOURAGED OOB to chair  - encouraged incentive spirometry       - COVID-19 antibodies are low / negative  - OFFERED, and patient CONSENTED to Convalescent Plasma as part of EUA  - will give 1 unit of CP.  type and screen and confirmation ordered.   - will monitor patient during and after transfusion of CP        Will follow with you.

## 2021-03-04 LAB
ALBUMIN SERPL ELPH-MCNC: 2.9 G/DL — LOW (ref 3.3–5.2)
ALBUMIN SERPL ELPH-MCNC: 3 G/DL — LOW (ref 3.3–5.2)
ALP SERPL-CCNC: 103 U/L — SIGNIFICANT CHANGE UP (ref 40–120)
ALP SERPL-CCNC: 110 U/L — SIGNIFICANT CHANGE UP (ref 40–120)
ALT FLD-CCNC: 49 U/L — HIGH
ALT FLD-CCNC: 50 U/L — HIGH
ANION GAP SERPL CALC-SCNC: 15 MMOL/L — SIGNIFICANT CHANGE UP (ref 5–17)
AST SERPL-CCNC: 48 U/L — HIGH
AST SERPL-CCNC: 51 U/L — HIGH
BASOPHILS # BLD AUTO: 0.01 K/UL — SIGNIFICANT CHANGE UP (ref 0–0.2)
BASOPHILS NFR BLD AUTO: 0.1 % — SIGNIFICANT CHANGE UP (ref 0–2)
BILIRUB DIRECT SERPL-MCNC: 0.1 MG/DL — SIGNIFICANT CHANGE UP (ref 0–0.3)
BILIRUB INDIRECT FLD-MCNC: 0.2 MG/DL — SIGNIFICANT CHANGE UP (ref 0.2–1)
BILIRUB SERPL-MCNC: 0.4 MG/DL — SIGNIFICANT CHANGE UP (ref 0.4–2)
BILIRUB SERPL-MCNC: 0.4 MG/DL — SIGNIFICANT CHANGE UP (ref 0.4–2)
BUN SERPL-MCNC: 23 MG/DL — HIGH (ref 8–20)
CALCIUM SERPL-MCNC: 8.7 MG/DL — SIGNIFICANT CHANGE UP (ref 8.6–10.2)
CHLORIDE SERPL-SCNC: 96 MMOL/L — LOW (ref 98–107)
CO2 SERPL-SCNC: 21 MMOL/L — LOW (ref 22–29)
CREAT SERPL-MCNC: 0.85 MG/DL — SIGNIFICANT CHANGE UP (ref 0.5–1.3)
CULTURE RESULTS: SIGNIFICANT CHANGE UP
CULTURE RESULTS: SIGNIFICANT CHANGE UP
EOSINOPHIL # BLD AUTO: 0 K/UL — SIGNIFICANT CHANGE UP (ref 0–0.5)
EOSINOPHIL NFR BLD AUTO: 0 % — SIGNIFICANT CHANGE UP (ref 0–6)
GLUCOSE BLDC GLUCOMTR-MCNC: 210 MG/DL — HIGH (ref 70–99)
GLUCOSE BLDC GLUCOMTR-MCNC: 225 MG/DL — HIGH (ref 70–99)
GLUCOSE BLDC GLUCOMTR-MCNC: 267 MG/DL — HIGH (ref 70–99)
GLUCOSE BLDC GLUCOMTR-MCNC: 302 MG/DL — HIGH (ref 70–99)
GLUCOSE SERPL-MCNC: 219 MG/DL — HIGH (ref 70–99)
HCT VFR BLD CALC: 38.8 % — SIGNIFICANT CHANGE UP (ref 34.5–45)
HGB BLD-MCNC: 12.5 G/DL — SIGNIFICANT CHANGE UP (ref 11.5–15.5)
IMM GRANULOCYTES NFR BLD AUTO: 2.6 % — HIGH (ref 0–1.5)
INR BLD: 1.04 RATIO — SIGNIFICANT CHANGE UP (ref 0.88–1.16)
LYMPHOCYTES # BLD AUTO: 1.03 K/UL — SIGNIFICANT CHANGE UP (ref 1–3.3)
LYMPHOCYTES # BLD AUTO: 13.4 % — SIGNIFICANT CHANGE UP (ref 13–44)
MCHC RBC-ENTMCNC: 25.2 PG — LOW (ref 27–34)
MCHC RBC-ENTMCNC: 32.2 GM/DL — SIGNIFICANT CHANGE UP (ref 32–36)
MCV RBC AUTO: 78.1 FL — LOW (ref 80–100)
MONOCYTES # BLD AUTO: 0.61 K/UL — SIGNIFICANT CHANGE UP (ref 0–0.9)
MONOCYTES NFR BLD AUTO: 8 % — SIGNIFICANT CHANGE UP (ref 2–14)
NEUTROPHILS # BLD AUTO: 5.82 K/UL — SIGNIFICANT CHANGE UP (ref 1.8–7.4)
NEUTROPHILS NFR BLD AUTO: 75.9 % — SIGNIFICANT CHANGE UP (ref 43–77)
PLATELET # BLD AUTO: 399 K/UL — SIGNIFICANT CHANGE UP (ref 150–400)
POTASSIUM SERPL-MCNC: 4.6 MMOL/L — SIGNIFICANT CHANGE UP (ref 3.5–5.3)
POTASSIUM SERPL-SCNC: 4.6 MMOL/L — SIGNIFICANT CHANGE UP (ref 3.5–5.3)
PROT SERPL-MCNC: 7.1 G/DL — SIGNIFICANT CHANGE UP (ref 6.6–8.7)
PROT SERPL-MCNC: 7.1 G/DL — SIGNIFICANT CHANGE UP (ref 6.6–8.7)
PROTHROM AB SERPL-ACNC: 12 SEC — SIGNIFICANT CHANGE UP (ref 10.6–13.6)
RBC # BLD: 4.97 M/UL — SIGNIFICANT CHANGE UP (ref 3.8–5.2)
RBC # FLD: 14 % — SIGNIFICANT CHANGE UP (ref 10.3–14.5)
SODIUM SERPL-SCNC: 132 MMOL/L — LOW (ref 135–145)
SPECIMEN SOURCE: SIGNIFICANT CHANGE UP
SPECIMEN SOURCE: SIGNIFICANT CHANGE UP
WBC # BLD: 7.67 K/UL — SIGNIFICANT CHANGE UP (ref 3.8–10.5)
WBC # FLD AUTO: 7.67 K/UL — SIGNIFICANT CHANGE UP (ref 3.8–10.5)

## 2021-03-04 PROCEDURE — 76775 US EXAM ABDO BACK WALL LIM: CPT | Mod: 26

## 2021-03-04 PROCEDURE — 99233 SBSQ HOSP IP/OBS HIGH 50: CPT | Mod: CS

## 2021-03-04 PROCEDURE — 99232 SBSQ HOSP IP/OBS MODERATE 35: CPT | Mod: CS

## 2021-03-04 RX ORDER — INSULIN LISPRO 100/ML
2 VIAL (ML) SUBCUTANEOUS
Refills: 0 | Status: DISCONTINUED | OUTPATIENT
Start: 2021-03-04 | End: 2021-03-05

## 2021-03-04 RX ORDER — HYDRALAZINE HCL 50 MG
50 TABLET ORAL EVERY 8 HOURS
Refills: 0 | Status: DISCONTINUED | OUTPATIENT
Start: 2021-03-04 | End: 2021-03-05

## 2021-03-04 RX ORDER — INSULIN HUMAN 100 [IU]/ML
6 INJECTION, SOLUTION SUBCUTANEOUS ONCE
Refills: 0 | Status: COMPLETED | OUTPATIENT
Start: 2021-03-04 | End: 2021-03-04

## 2021-03-04 RX ORDER — INSULIN GLARGINE 100 [IU]/ML
6 INJECTION, SOLUTION SUBCUTANEOUS AT BEDTIME
Refills: 0 | Status: DISCONTINUED | OUTPATIENT
Start: 2021-03-04 | End: 2021-03-05

## 2021-03-04 RX ADMIN — Medication 25 MILLIGRAM(S): at 05:30

## 2021-03-04 RX ADMIN — Medication 10 MILLIGRAM(S): at 16:21

## 2021-03-04 RX ADMIN — Medication 50 MILLIGRAM(S): at 13:14

## 2021-03-04 RX ADMIN — Medication 6 MILLIGRAM(S): at 05:30

## 2021-03-04 RX ADMIN — Medication 4: at 07:44

## 2021-03-04 RX ADMIN — INSULIN HUMAN 6 UNIT(S): 100 INJECTION, SOLUTION SUBCUTANEOUS at 09:18

## 2021-03-04 RX ADMIN — REMDESIVIR 500 MILLIGRAM(S): 5 INJECTION INTRAVENOUS at 09:18

## 2021-03-04 RX ADMIN — Medication 2 UNIT(S): at 16:24

## 2021-03-04 RX ADMIN — AMLODIPINE BESYLATE 10 MILLIGRAM(S): 2.5 TABLET ORAL at 05:30

## 2021-03-04 RX ADMIN — Medication 8: at 16:23

## 2021-03-04 RX ADMIN — LISINOPRIL 10 MILLIGRAM(S): 2.5 TABLET ORAL at 05:30

## 2021-03-04 RX ADMIN — Medication 10 MILLIGRAM(S): at 06:14

## 2021-03-04 RX ADMIN — Medication 2 UNIT(S): at 11:32

## 2021-03-04 RX ADMIN — Medication 50 MILLIGRAM(S): at 21:07

## 2021-03-04 RX ADMIN — Medication 2 UNIT(S): at 09:18

## 2021-03-04 RX ADMIN — INSULIN GLARGINE 6 UNIT(S): 100 INJECTION, SOLUTION SUBCUTANEOUS at 21:22

## 2021-03-04 RX ADMIN — ENOXAPARIN SODIUM 40 MILLIGRAM(S): 100 INJECTION SUBCUTANEOUS at 13:11

## 2021-03-04 RX ADMIN — Medication 4: at 11:32

## 2021-03-04 NOTE — DIETITIAN INITIAL EVALUATION ADULT. - PERTINENT LABORATORY DATA
03-04 Na132 mmol/L<L> Glu 219 mg/dL<H> K+ 4.6 mmol/L Cr  0.85 mg/dL BUN 23.0 mg/dL<H> Phos n/a   Alb 2.9 g/dL<L> PAB n/a

## 2021-03-04 NOTE — DIETITIAN INITIAL EVALUATION ADULT. - OTHER INFO
65F hx HTN, prediabetes p/w fever, cough and body aches found to be hypoxic 2/2 covid19. Pt on 5 L NC. No n,v,d,c, adb pain noted as per EMR. A1C 7.7 noted. Needs further weaning of O2 to be discharged.

## 2021-03-04 NOTE — DIETITIAN INITIAL EVALUATION ADULT. - ADD RECOMMEND
Encouragement needed during meal times. Rec Thiamine, MVI, Vit C, Vit D. Rec pt follow up with endocrinologist and RD as out patient.

## 2021-03-04 NOTE — PROGRESS NOTE ADULT - ATTENDING COMMENTS
Patient was seen and examined at bedside. States that she doesn't know why her blood pressure is elevated. Otherwise no complaints.    PHYSICAL EXAM:  Vital Signs Last 24 Hrs  T(F): 97.9 (04 Mar 2021 09:16), Max: 99.3 (03 Mar 2021 16:32)  HR: 85 (04 Mar 2021 09:16) (78 - 99)  BP: 163/90 (04 Mar 2021 09:16) (163/75 - 174/79)  RR: 20 (04 Mar 2021 09:16) (18 - 20)  SpO2: 90% (04 Mar 2021 09:16) (88% - 94%)  GENERAL: NAD, Resting in a chair with Non-rebreather  Eyes: EOMI, PERRLA  ENMT: Conjunctiva and sclera clear; supple neck, No JVD  Cardiovascular: S1+S2 audible, regular rate & rhythm, no murmur, rubs or gallops.  Respiratory: Decreased breath sounds b/l  GI: Bowel sounds present; Soft, Nontender, Nondistended. No hepatomegaly  Genitourinary: Deferred  Musculoskeletal: - 5/5 strength b/l upper and lower extremities, normal range of motion, no swollen or erythematous joints  Skin:  no breakdowns, ulcers or discharge, No rashes or lesions  Neurology: Alert & Oriented X3, non-focal and spontaneous movements of all extremities, CN 2 to 12 grossly intact   Psych: Appropriate mood and affect, calm, pleasant, Responds appropriately to questions      Will continue with supportive therapy, Wean oxygen as tolerated  C/w Remdesevir, dexamethasone   Increase bp meds  Lovenox prophylaxis  Spoke with patients son and updated on 3/4/2021
Patient seen and examined at bedside. States to be feeling SOB. Overnight was found to be hypoxic saturating in the 80s. Was started on oxygen.    PHYSICAL EXAM:  Vital Signs Last 24 Hrs  T(F): 97.9 (02 Mar 2021 15:26), Max: 102.8 (01 Mar 2021 16:46)  HR: 93 (02 Mar 2021 14:10) (87 - 107)  BP: 151/83 (02 Mar 2021 14:10) (151/83 - 176/92)  RR: 20 (02 Mar 2021 12:30) (18 - 20)  SpO2: 95% (02 Mar 2021 12:30) (85% - 95%)    GENERAL: NAD, On non-rebreather  Eyes: EOMI, PERRLA  ENMT: Conjunctiva and sclera clear;   Cardiovascular: S1+S2 audible, regular rate & rhythm, no murmur, rubs or gallops.  Respiratory: Decreased air entry b/l  GI: Bowel sounds present; Soft, Nontender, Nondistended. No hepatomegaly  Genitourinary: Deferred  Musculoskeletal: - 5/5 strength b/l upper and lower extremities, normal range of motion, no swollen or erythematous joints  Skin:  no breakdowns, ulcers or discharge, No rashes or lesions  Neurology: Alert & Oriented X3, non-focal and spontaneous movements of all extremities, CN 2 to 12 grossly intact   Psych: Appropriate mood and affect, calm, pleasant, Responds appropriately to questions      Patients d-dimer was elevated. Started on Empiric lovenox pending CTA but CTA was no PE. Will continue to monitor. ID was consulted and will give Plasma to patient. C/w Decadron and Remdesevir    Now remains acute secondary to hypoxia  Attempted to call patients son and left a VM.
Patient seen and examined at bedside. Patient resting, offers no complaints. Seen in a chair.    PHYSICAL EXAM:  Vital Signs Last 24 Hrs  T(F): 99 (03 Mar 2021 09:22), Max: 99 (03 Mar 2021 09:22)  HR: 92 (03 Mar 2021 09:22) (74 - 96)  BP: 159/80 (03 Mar 2021 09:22) (147/93 - 175/96)  RR: 20 (03 Mar 2021 10:04) (20 - 20)  SpO2: 92% (03 Mar 2021 10:04) (85% - 95%)    GENERAL: NAD, Resting in a chair with Non-rebreather  Eyes: EOMI, PERRLA  ENMT: Conjunctiva and sclera clear; supple neck, No JVD  Cardiovascular: S1+S2 audible, regular rate & rhythm, no murmur, rubs or gallops.  Respiratory: Some ronchi b/l   GI: Bowel sounds present; Soft, Nontender, Nondistended. No hepatomegaly  Genitourinary: Deferred  Musculoskeletal: - 5/5 strength b/l upper and lower extremities, normal range of motion, no swollen or erythematous joints  Skin:  no breakdowns, ulcers or discharge, No rashes or lesions  Neurology: Alert & Oriented X3, non-focal and spontaneous movements of all extremities, CN 2 to 12 grossly intact   Psych: Appropriate mood and affect, calm, pleasant, Responds appropriately to questions    As per ID, Tocalizumab will be given today. C/w dexamtheasone and remdesevir. Prone, chest pt, IS. Continue with supportive care.   Discussed with patients mp Fuchs and updated on 3/3/2021.  Continue all care as above, Wean as tolerated

## 2021-03-04 NOTE — PROGRESS NOTE ADULT - ASSESSMENT
This 65F hx HTN, prediabetes p/w fever, cough and body aches. Patient states started feeling sick 5 days ago. She's had body aches, fevers, headache and generalized weakness. She's also had decreased po intake. Her  was admitted here yesterday for covid19. She denies sob, nausea, vomiting, diarrhea, chest pain or abdominal pain. Patient denies dysuria or urinary frequency.   In ER, pt tmax 100, pulse 88 bp 182, 85, RR- 20 and spo2 initially 95 on room air. Patient was given 2L in bolus fluid. Initially planning to be discharged, but then hypoxia worsened to 92 at rest and 91 with ambulation.  (27 Feb 2021 23:07)    patient is confirmed with COVID-19.  CXR with right base opacification.   patient's  is also sick with COVID-198    patient has been here for a few days, was well at first, then started on Remdesivir  3/2/2021    Impression:  COVID-19 infection   Viral pneumonia  shortness of breath  lung infiltrates  dependence on oxygen    Plan:  s/p Convalescent plasma 3/2/21  s/p Tocilizumab x 1 dose 3/3/21     - continue Remdesivir IV daily.  will plan for a 5 day course.  THRU  3/6/21  WILL likely extend      - continue dexamethasone 6mg daily, While on remdesivir  Inflammatory markers and LFTs WILL BE ORDERED with the REMDESIVIR order SET.  DO NOT ORDER this additionally.   - trend CBC with diff, CMP  daily    - Continue supportive care measures  - continue Oxygenation as needed;  CONTINUE to titrate down as tolerated  - self- proning  as tolerated  - ENCOURAGED OOB to chair  - encouraged incentive spirometry          Will follow with you.

## 2021-03-04 NOTE — PHYSICAL THERAPY INITIAL EVALUATION ADULT - GAIT PATTERN USED, PT EVAL
O2 sat dropped to 85% on 5L of O2 after ambulation rising to 90% with 3 minute sitting rest break, RN aware,

## 2021-03-04 NOTE — PROGRESS NOTE ADULT - ASSESSMENT
65F hx HTN, diabetes p/w fever, cough and body aches found to be hypoxic 2/2 covid19.    Covid  - Improving, weaned from NRB to 5L NC, continue to wean as able  - S/p convalescent plasma x1  - Initiated Tocilizumab 3/3  - C/w remdesivir/decadron  - ID consult appreicated   - Chest CT negative for PE and LE US negative for DVT as of 3/2  - Inflammatory markers reviewed, continue to trend  - Pt with elevated temps 2/2 covid. Infectious work up insignificant, Bcx negative, Ucx w/ GNR 10-50K  - C/w Tylenol PRN  - Monitor/  - Supportive care    Hypertensive urgency  - S/p lasix 20mg IV x 1 (2/28)  - Started hydralazine 25mg q8 hours for better control  - Also increased lisinopril to 10mg QD   - C/w Norvasc 10mg  - Hydralazine 10 mg PRN for SBP >150  - Renal US    Hyponatremia   - 132 again today  - Received 2L NS in ED  - Likely 2/2 decreased po intake; encouraged PO intake   - Trend BMP    Abnormal UA  - Likely contaminant given moderate epithelials  - Patient denies urinary symptoms  - UCx w/ 10-50K GNR  - Abx not necessary at this time    Diabetes  - Hga1c 7.7  - Not on medication at home per chart  - C/w lantus and admelog  - Monitor accu checks, titrate meds as needed  - Will need glucometer teaching prior to discharge. Will also need to follow up with PMD within 1 week of DC for further mgt   - Consistent carb diet    DVT ppx- Lovenox 40mg QD    #goc- full code    DISPO: Pending clinical improvement, further weaning of O2. Pt ambulates independently so plan for DC home once stable

## 2021-03-05 LAB
ALBUMIN SERPL ELPH-MCNC: 2.9 G/DL — LOW (ref 3.3–5.2)
ALBUMIN SERPL ELPH-MCNC: 3.1 G/DL — LOW (ref 3.3–5.2)
ALP SERPL-CCNC: 108 U/L — SIGNIFICANT CHANGE UP (ref 40–120)
ALP SERPL-CCNC: 108 U/L — SIGNIFICANT CHANGE UP (ref 40–120)
ALT FLD-CCNC: 97 U/L — HIGH
ALT FLD-CCNC: 97 U/L — HIGH
ANION GAP SERPL CALC-SCNC: 18 MMOL/L — HIGH (ref 5–17)
AST SERPL-CCNC: 136 U/L — HIGH
AST SERPL-CCNC: 137 U/L — HIGH
BASOPHILS # BLD AUTO: 0.01 K/UL — SIGNIFICANT CHANGE UP (ref 0–0.2)
BASOPHILS NFR BLD AUTO: 0.1 % — SIGNIFICANT CHANGE UP (ref 0–2)
BILIRUB DIRECT SERPL-MCNC: 0.2 MG/DL — SIGNIFICANT CHANGE UP (ref 0–0.3)
BILIRUB INDIRECT FLD-MCNC: 0.3 MG/DL — SIGNIFICANT CHANGE UP (ref 0.2–1)
BILIRUB SERPL-MCNC: 0.4 MG/DL — SIGNIFICANT CHANGE UP (ref 0.4–2)
BILIRUB SERPL-MCNC: 0.4 MG/DL — SIGNIFICANT CHANGE UP (ref 0.4–2)
BUN SERPL-MCNC: 25 MG/DL — HIGH (ref 8–20)
CALCIUM SERPL-MCNC: 8.5 MG/DL — LOW (ref 8.6–10.2)
CHLORIDE SERPL-SCNC: 99 MMOL/L — SIGNIFICANT CHANGE UP (ref 98–107)
CO2 SERPL-SCNC: 18 MMOL/L — LOW (ref 22–29)
CREAT SERPL-MCNC: 0.85 MG/DL — SIGNIFICANT CHANGE UP (ref 0.5–1.3)
EOSINOPHIL # BLD AUTO: 0 K/UL — SIGNIFICANT CHANGE UP (ref 0–0.5)
EOSINOPHIL NFR BLD AUTO: 0 % — SIGNIFICANT CHANGE UP (ref 0–6)
GLUCOSE BLDC GLUCOMTR-MCNC: 188 MG/DL — HIGH (ref 70–99)
GLUCOSE BLDC GLUCOMTR-MCNC: 232 MG/DL — HIGH (ref 70–99)
GLUCOSE BLDC GLUCOMTR-MCNC: 263 MG/DL — HIGH (ref 70–99)
GLUCOSE BLDC GLUCOMTR-MCNC: 387 MG/DL — HIGH (ref 70–99)
GLUCOSE SERPL-MCNC: 207 MG/DL — HIGH (ref 70–99)
HCT VFR BLD CALC: 39.9 % — SIGNIFICANT CHANGE UP (ref 34.5–45)
HGB BLD-MCNC: 12.9 G/DL — SIGNIFICANT CHANGE UP (ref 11.5–15.5)
IMM GRANULOCYTES NFR BLD AUTO: 4.7 % — HIGH (ref 0–1.5)
INR BLD: 0.97 RATIO — SIGNIFICANT CHANGE UP (ref 0.88–1.16)
LYMPHOCYTES # BLD AUTO: 0.85 K/UL — LOW (ref 1–3.3)
LYMPHOCYTES # BLD AUTO: 12.2 % — LOW (ref 13–44)
MCHC RBC-ENTMCNC: 25.2 PG — LOW (ref 27–34)
MCHC RBC-ENTMCNC: 32.3 GM/DL — SIGNIFICANT CHANGE UP (ref 32–36)
MCV RBC AUTO: 77.9 FL — LOW (ref 80–100)
MONOCYTES # BLD AUTO: 0.58 K/UL — SIGNIFICANT CHANGE UP (ref 0–0.9)
MONOCYTES NFR BLD AUTO: 8.3 % — SIGNIFICANT CHANGE UP (ref 2–14)
NEUTROPHILS # BLD AUTO: 5.19 K/UL — SIGNIFICANT CHANGE UP (ref 1.8–7.4)
NEUTROPHILS NFR BLD AUTO: 74.7 % — SIGNIFICANT CHANGE UP (ref 43–77)
PLATELET # BLD AUTO: 515 K/UL — HIGH (ref 150–400)
POTASSIUM SERPL-MCNC: 4.4 MMOL/L — SIGNIFICANT CHANGE UP (ref 3.5–5.3)
POTASSIUM SERPL-SCNC: 4.4 MMOL/L — SIGNIFICANT CHANGE UP (ref 3.5–5.3)
PROT SERPL-MCNC: 6.9 G/DL — SIGNIFICANT CHANGE UP (ref 6.6–8.7)
PROT SERPL-MCNC: 7 G/DL — SIGNIFICANT CHANGE UP (ref 6.6–8.7)
PROTHROM AB SERPL-ACNC: 11.3 SEC — SIGNIFICANT CHANGE UP (ref 10.6–13.6)
RBC # BLD: 5.12 M/UL — SIGNIFICANT CHANGE UP (ref 3.8–5.2)
RBC # FLD: 13.8 % — SIGNIFICANT CHANGE UP (ref 10.3–14.5)
SODIUM SERPL-SCNC: 135 MMOL/L — SIGNIFICANT CHANGE UP (ref 135–145)
WBC # BLD: 6.96 K/UL — SIGNIFICANT CHANGE UP (ref 3.8–10.5)
WBC # FLD AUTO: 6.96 K/UL — SIGNIFICANT CHANGE UP (ref 3.8–10.5)

## 2021-03-05 PROCEDURE — 99232 SBSQ HOSP IP/OBS MODERATE 35: CPT | Mod: CS

## 2021-03-05 RX ORDER — LANOLIN ALCOHOL/MO/W.PET/CERES
3 CREAM (GRAM) TOPICAL ONCE
Refills: 0 | Status: COMPLETED | OUTPATIENT
Start: 2021-03-05 | End: 2021-03-05

## 2021-03-05 RX ORDER — INSULIN GLARGINE 100 [IU]/ML
10 INJECTION, SOLUTION SUBCUTANEOUS AT BEDTIME
Refills: 0 | Status: DISCONTINUED | OUTPATIENT
Start: 2021-03-05 | End: 2021-03-06

## 2021-03-05 RX ORDER — REMDESIVIR 5 MG/ML
100 INJECTION INTRAVENOUS EVERY 24 HOURS
Refills: 0 | Status: DISCONTINUED | OUTPATIENT
Start: 2021-03-07 | End: 2021-03-10

## 2021-03-05 RX ORDER — HYDRALAZINE HCL 50 MG
10 TABLET ORAL ONCE
Refills: 0 | Status: COMPLETED | OUTPATIENT
Start: 2021-03-05 | End: 2021-03-05

## 2021-03-05 RX ORDER — HYDROCHLOROTHIAZIDE 25 MG
25 TABLET ORAL DAILY
Refills: 0 | Status: DISCONTINUED | OUTPATIENT
Start: 2021-03-05 | End: 2021-03-10

## 2021-03-05 RX ORDER — HYDRALAZINE HCL 50 MG
100 TABLET ORAL EVERY 8 HOURS
Refills: 0 | Status: DISCONTINUED | OUTPATIENT
Start: 2021-03-05 | End: 2021-03-10

## 2021-03-05 RX ORDER — INSULIN LISPRO 100/ML
4 VIAL (ML) SUBCUTANEOUS
Refills: 0 | Status: DISCONTINUED | OUTPATIENT
Start: 2021-03-05 | End: 2021-03-10

## 2021-03-05 RX ADMIN — Medication 10 MILLIGRAM(S): at 17:55

## 2021-03-05 RX ADMIN — INSULIN GLARGINE 10 UNIT(S): 100 INJECTION, SOLUTION SUBCUTANEOUS at 21:00

## 2021-03-05 RX ADMIN — Medication 100 MILLIGRAM(S): at 22:10

## 2021-03-05 RX ADMIN — Medication 2 UNIT(S): at 09:00

## 2021-03-05 RX ADMIN — Medication 50 MILLIGRAM(S): at 05:26

## 2021-03-05 RX ADMIN — REMDESIVIR 500 MILLIGRAM(S): 5 INJECTION INTRAVENOUS at 09:57

## 2021-03-05 RX ADMIN — Medication 4 UNIT(S): at 17:54

## 2021-03-05 RX ADMIN — Medication 3 MILLIGRAM(S): at 22:09

## 2021-03-05 RX ADMIN — Medication 4 UNIT(S): at 13:10

## 2021-03-05 RX ADMIN — Medication 50 MILLIGRAM(S): at 13:10

## 2021-03-05 RX ADMIN — Medication 10 MILLIGRAM(S): at 06:20

## 2021-03-05 RX ADMIN — Medication 6: at 17:55

## 2021-03-05 RX ADMIN — Medication 25 MILLIGRAM(S): at 13:10

## 2021-03-05 RX ADMIN — Medication 4: at 09:01

## 2021-03-05 RX ADMIN — Medication 6 MILLIGRAM(S): at 05:26

## 2021-03-05 RX ADMIN — ENOXAPARIN SODIUM 40 MILLIGRAM(S): 100 INJECTION SUBCUTANEOUS at 13:11

## 2021-03-05 RX ADMIN — AMLODIPINE BESYLATE 10 MILLIGRAM(S): 2.5 TABLET ORAL at 05:26

## 2021-03-05 RX ADMIN — Medication 10 MILLIGRAM(S): at 20:58

## 2021-03-05 RX ADMIN — Medication 10: at 13:11

## 2021-03-05 RX ADMIN — LISINOPRIL 10 MILLIGRAM(S): 2.5 TABLET ORAL at 05:26

## 2021-03-05 NOTE — PROGRESS NOTE ADULT - ASSESSMENT
65F hx HTN, diabetes p/w fever, cough and body aches found to be hypoxic 2/2 covid19. Patient was initially placed on non-rebreather and now transitioned to NC. Was given plasma, tocailizumab and remdesvir. Patient was also noted to be in hypertensive urgency and started on medications.     Covid  - Improving, weaned from NRB to 5L NC, continue to wean as able  - S/p convalescent plasma x1  - Initiated Tocilizumab 3/3  - C/w remdesivir/decadron  - ID consult appreicated   - Chest CT negative for PE and LE US negative for DVT as of 3/2  - Inflammatory markers reviewed, continue to trend  - Pt with elevated temps 2/2 covid. Infectious work up insignificant, Bcx negative, Ucx w/ GNR 10-50K  - C/w Tylenol PRN  - Monitor/  - Supportive care    Hypertensive urgency  - S/p lasix 20mg IV x 1 (2/28)  - Started on HCTZ 25mg daily  - C/w hydralazine 50mg q8 hours for better control  - Also increased lisinopril to 10mg QD   - C/w Norvasc 10mg  - Hydralazine 10 mg PRN for SBP >150  - Renal US    Hyponatremia - Resolved  - Received 2L NS in ED  - Likely 2/2 decreased po intake; encouraged PO intake   - Trend BMP    Abnormal UA  - Likely contaminant given moderate epithelials  - Patient denies urinary symptoms  - UCx w/ 10-50K GNR  - Abx not necessary at this time    Diabetes  - Hga1c 7.7  - Not on medication at home per chart  - C/w lantus and admelog  - Monitor accu checks, titrate meds as needed  - Will need glucometer teaching prior to discharge. Will also need to follow up with PMD within 1 week of DC for further mgt   - Consistent carb diet    DVT ppx- Lovenox 40mg QD    #goc- full code    Discussed with patients mp Fuchs and updated on 3/5/2021  PT: Home  DISPO: Pending clinical improvement, further weaning of O2. Pt ambulates independently so plan for DC home once stable

## 2021-03-05 NOTE — PROGRESS NOTE ADULT - ASSESSMENT
This 65F hx HTN, prediabetes p/w fever, cough and body aches. Patient states started feeling sick 5 days ago. She's had body aches, fevers, headache and generalized weakness. She's also had decreased po intake. Her  was admitted here yesterday for covid19. She denies sob, nausea, vomiting, diarrhea, chest pain or abdominal pain. Patient denies dysuria or urinary frequency.   In ER, pt tmax 100, pulse 88 bp 182, 85, RR- 20 and spo2 initially 95 on room air. Patient was given 2L in bolus fluid. Initially planning to be discharged, but then hypoxia worsened to 92 at rest and 91 with ambulation.  (27 Feb 2021 23:07)    patient is confirmed with COVID-19.  CXR with right base opacification.   patient's  is also sick with COVID-198    patient has been here for a few days, was well at first, then started on Remdesivir  3/2/2021    Impression:  COVID-19 infection   Viral pneumonia  shortness of breath  lung infiltrates  dependence on oxygen    Plan:  s/p Convalescent plasma 3/2/21  s/p Tocilizumab x 1 dose 3/3/21     - continue Remdesivir IV daily.  will plan for a 10 day course.  THRU  3/11/21    - continue dexamethasone 6mg daily, While on remdesivir  Inflammatory markers and LFTs WILL BE ORDERED with the REMDESIVIR order SET.  DO NOT ORDER this additionally.   - trend CBC with diff, CMP  daily    - Continue supportive care measures  - continue Oxygenation as needed;  CONTINUE to titrate down as tolerated  - self- proning  as tolerated  - ENCOURAGED OOB to chair  - encouraged incentive spirometry      Will follow with you.

## 2021-03-06 LAB
ALBUMIN SERPL ELPH-MCNC: 3.2 G/DL — LOW (ref 3.3–5.2)
ALBUMIN SERPL ELPH-MCNC: 3.2 G/DL — LOW (ref 3.3–5.2)
ALP SERPL-CCNC: 115 U/L — SIGNIFICANT CHANGE UP (ref 40–120)
ALP SERPL-CCNC: 116 U/L — SIGNIFICANT CHANGE UP (ref 40–120)
ALT FLD-CCNC: 108 U/L — HIGH
ALT FLD-CCNC: 111 U/L — HIGH
ANION GAP SERPL CALC-SCNC: 20 MMOL/L — HIGH (ref 5–17)
AST SERPL-CCNC: 121 U/L — HIGH
AST SERPL-CCNC: 122 U/L — HIGH
BASOPHILS # BLD AUTO: 0.03 K/UL — SIGNIFICANT CHANGE UP (ref 0–0.2)
BASOPHILS NFR BLD AUTO: 0.4 % — SIGNIFICANT CHANGE UP (ref 0–2)
BILIRUB DIRECT SERPL-MCNC: 0.2 MG/DL — SIGNIFICANT CHANGE UP (ref 0–0.3)
BILIRUB INDIRECT FLD-MCNC: 0.3 MG/DL — SIGNIFICANT CHANGE UP (ref 0.2–1)
BILIRUB SERPL-MCNC: 0.5 MG/DL — SIGNIFICANT CHANGE UP (ref 0.4–2)
BILIRUB SERPL-MCNC: 0.5 MG/DL — SIGNIFICANT CHANGE UP (ref 0.4–2)
BUN SERPL-MCNC: 25 MG/DL — HIGH (ref 8–20)
CALCIUM SERPL-MCNC: 8.7 MG/DL — SIGNIFICANT CHANGE UP (ref 8.6–10.2)
CHLORIDE SERPL-SCNC: 95 MMOL/L — LOW (ref 98–107)
CO2 SERPL-SCNC: 18 MMOL/L — LOW (ref 22–29)
CREAT SERPL-MCNC: 0.98 MG/DL — SIGNIFICANT CHANGE UP (ref 0.5–1.3)
D DIMER BLD IA.RAPID-MCNC: 2586 NG/ML DDU — HIGH
EOSINOPHIL # BLD AUTO: 0.01 K/UL — SIGNIFICANT CHANGE UP (ref 0–0.5)
EOSINOPHIL NFR BLD AUTO: 0.1 % — SIGNIFICANT CHANGE UP (ref 0–6)
GLUCOSE BLDC GLUCOMTR-MCNC: 143 MG/DL — HIGH (ref 70–99)
GLUCOSE BLDC GLUCOMTR-MCNC: 204 MG/DL — HIGH (ref 70–99)
GLUCOSE BLDC GLUCOMTR-MCNC: 223 MG/DL — HIGH (ref 70–99)
GLUCOSE BLDC GLUCOMTR-MCNC: 250 MG/DL — HIGH (ref 70–99)
GLUCOSE SERPL-MCNC: 197 MG/DL — HIGH (ref 70–99)
HCT VFR BLD CALC: 41.9 % — SIGNIFICANT CHANGE UP (ref 34.5–45)
HGB BLD-MCNC: 13.5 G/DL — SIGNIFICANT CHANGE UP (ref 11.5–15.5)
IMM GRANULOCYTES NFR BLD AUTO: 5.4 % — HIGH (ref 0–1.5)
INR BLD: 1.09 RATIO — SIGNIFICANT CHANGE UP (ref 0.88–1.16)
LYMPHOCYTES # BLD AUTO: 0.87 K/UL — LOW (ref 1–3.3)
LYMPHOCYTES # BLD AUTO: 10.5 % — LOW (ref 13–44)
MCHC RBC-ENTMCNC: 25.1 PG — LOW (ref 27–34)
MCHC RBC-ENTMCNC: 32.2 GM/DL — SIGNIFICANT CHANGE UP (ref 32–36)
MCV RBC AUTO: 77.9 FL — LOW (ref 80–100)
MONOCYTES # BLD AUTO: 0.54 K/UL — SIGNIFICANT CHANGE UP (ref 0–0.9)
MONOCYTES NFR BLD AUTO: 6.5 % — SIGNIFICANT CHANGE UP (ref 2–14)
NEUTROPHILS # BLD AUTO: 6.39 K/UL — SIGNIFICANT CHANGE UP (ref 1.8–7.4)
NEUTROPHILS NFR BLD AUTO: 77.1 % — HIGH (ref 43–77)
PLATELET # BLD AUTO: 603 K/UL — HIGH (ref 150–400)
POTASSIUM SERPL-MCNC: 4.5 MMOL/L — SIGNIFICANT CHANGE UP (ref 3.5–5.3)
POTASSIUM SERPL-SCNC: 4.5 MMOL/L — SIGNIFICANT CHANGE UP (ref 3.5–5.3)
PROT SERPL-MCNC: 7.1 G/DL — SIGNIFICANT CHANGE UP (ref 6.6–8.7)
PROT SERPL-MCNC: 7.1 G/DL — SIGNIFICANT CHANGE UP (ref 6.6–8.7)
PROTHROM AB SERPL-ACNC: 12.6 SEC — SIGNIFICANT CHANGE UP (ref 10.6–13.6)
RBC # BLD: 5.38 M/UL — HIGH (ref 3.8–5.2)
RBC # FLD: 13.8 % — SIGNIFICANT CHANGE UP (ref 10.3–14.5)
SODIUM SERPL-SCNC: 133 MMOL/L — LOW (ref 135–145)
WBC # BLD: 8.29 K/UL — SIGNIFICANT CHANGE UP (ref 3.8–10.5)
WBC # FLD AUTO: 8.29 K/UL — SIGNIFICANT CHANGE UP (ref 3.8–10.5)

## 2021-03-06 PROCEDURE — 99233 SBSQ HOSP IP/OBS HIGH 50: CPT | Mod: CS

## 2021-03-06 PROCEDURE — 93975 VASCULAR STUDY: CPT | Mod: 26

## 2021-03-06 RX ORDER — ENOXAPARIN SODIUM 100 MG/ML
80 INJECTION SUBCUTANEOUS
Refills: 0 | Status: DISCONTINUED | OUTPATIENT
Start: 2021-03-06 | End: 2021-03-10

## 2021-03-06 RX ORDER — METOPROLOL TARTRATE 50 MG
25 TABLET ORAL
Refills: 0 | Status: DISCONTINUED | OUTPATIENT
Start: 2021-03-06 | End: 2021-03-10

## 2021-03-06 RX ORDER — INSULIN GLARGINE 100 [IU]/ML
16 INJECTION, SOLUTION SUBCUTANEOUS AT BEDTIME
Refills: 0 | Status: DISCONTINUED | OUTPATIENT
Start: 2021-03-06 | End: 2021-03-10

## 2021-03-06 RX ADMIN — ENOXAPARIN SODIUM 80 MILLIGRAM(S): 100 INJECTION SUBCUTANEOUS at 21:37

## 2021-03-06 RX ADMIN — Medication 4: at 17:49

## 2021-03-06 RX ADMIN — Medication 25 MILLIGRAM(S): at 17:49

## 2021-03-06 RX ADMIN — Medication 4 UNIT(S): at 17:49

## 2021-03-06 RX ADMIN — Medication 6 MILLIGRAM(S): at 05:19

## 2021-03-06 RX ADMIN — LISINOPRIL 10 MILLIGRAM(S): 2.5 TABLET ORAL at 05:19

## 2021-03-06 RX ADMIN — Medication 100 MILLIGRAM(S): at 21:37

## 2021-03-06 RX ADMIN — Medication 4: at 12:57

## 2021-03-06 RX ADMIN — ENOXAPARIN SODIUM 40 MILLIGRAM(S): 100 INJECTION SUBCUTANEOUS at 12:58

## 2021-03-06 RX ADMIN — Medication 100 MILLIGRAM(S): at 13:39

## 2021-03-06 RX ADMIN — Medication 25 MILLIGRAM(S): at 05:19

## 2021-03-06 RX ADMIN — Medication 25 MILLIGRAM(S): at 10:06

## 2021-03-06 RX ADMIN — Medication 4 UNIT(S): at 12:57

## 2021-03-06 RX ADMIN — INSULIN GLARGINE 16 UNIT(S): 100 INJECTION, SOLUTION SUBCUTANEOUS at 21:37

## 2021-03-06 RX ADMIN — Medication 4: at 09:05

## 2021-03-06 RX ADMIN — Medication 100 MILLIGRAM(S): at 05:19

## 2021-03-06 RX ADMIN — REMDESIVIR 500 MILLIGRAM(S): 5 INJECTION INTRAVENOUS at 12:57

## 2021-03-06 RX ADMIN — AMLODIPINE BESYLATE 10 MILLIGRAM(S): 2.5 TABLET ORAL at 05:19

## 2021-03-06 NOTE — PROGRESS NOTE ADULT - ASSESSMENT
65F hx HTN, diabetes p/w fever, cough and body aches found to be hypoxic 2/2 covid19. Patient was initially placed on non-rebreather and now transitioned to NC. Was given plasma, tocailizumab and remdesvir. Patient was also noted to be in hypertensive urgency and started on medications.     Covid  - Improving, weaned from NRB to 5L NC, continue to wean as able  - S/p convalescent plasma x1  - Initiated Tocilizumab 3/3  - C/w remdesivir/decadron  - ID on board  - Chest CT negative for PE and LE US negative for DVT as of 3/2  - trend inflammatory markers  - C/w Tylenol PRN  - Monitor/  - Supportive care    Hypertensive urgency  - S/p lasix 20mg IV x 1 (2/28)  - c/w HCTZ 25mg daily  - c/w metoprolol tartrate 25 mg BID   - C/w hydralazine 50mg q8 hours for better control  - c/w lisinopril to 10mg QD   - C/w Norvasc 10mg  - Hydralazine 10 mg PRN for SBP >150  - Renal US: Bilaterally increased renal cortical echogenicity compatible with medical renal disease. No hydronephrosis.      Hyponatremia - Resolved  - Received 2L NS in ED  - Likely 2/2 decreased po intake; encouraged PO intake   - Trend BMP    Abnormal UA  - Likely contaminant given moderate epithelials  - Patient denies urinary symptoms  - UCx w/ 10-50K GNR  - Abx not necessary at this time    Diabetes  - Hga1c 7.7  - Not on medication at home per chart  - C/w lantus and admelog  - Monitor accu checks, titrate meds as needed  - Will need glucometer teaching prior to discharge. Will also need to follow up with PMD within 1 week of DC for further mgt   - Consistent carb diet    DVT ppx- Lovenox 40mg QD    #goc- full code    PT: Home  DISPO: Pending clinical improvement, further weaning of O2. Pt ambulates independently so plan for DC home once stable

## 2021-03-07 LAB
ALBUMIN SERPL ELPH-MCNC: 3 G/DL — LOW (ref 3.3–5.2)
ALBUMIN SERPL ELPH-MCNC: 3.1 G/DL — LOW (ref 3.3–5.2)
ALP SERPL-CCNC: 104 U/L — SIGNIFICANT CHANGE UP (ref 40–120)
ALP SERPL-CCNC: 108 U/L — SIGNIFICANT CHANGE UP (ref 40–120)
ALT FLD-CCNC: 93 U/L — HIGH
ALT FLD-CCNC: 94 U/L — HIGH
ANION GAP SERPL CALC-SCNC: 19 MMOL/L — HIGH (ref 5–17)
AST SERPL-CCNC: 61 U/L — HIGH
AST SERPL-CCNC: 62 U/L — HIGH
BILIRUB DIRECT SERPL-MCNC: 0.2 MG/DL — SIGNIFICANT CHANGE UP (ref 0–0.3)
BILIRUB INDIRECT FLD-MCNC: 0.3 MG/DL — SIGNIFICANT CHANGE UP (ref 0.2–1)
BILIRUB SERPL-MCNC: 0.4 MG/DL — SIGNIFICANT CHANGE UP (ref 0.4–2)
BILIRUB SERPL-MCNC: 0.5 MG/DL — SIGNIFICANT CHANGE UP (ref 0.4–2)
BUN SERPL-MCNC: 25 MG/DL — HIGH (ref 8–20)
CALCIUM SERPL-MCNC: 8.5 MG/DL — LOW (ref 8.6–10.2)
CHLORIDE SERPL-SCNC: 95 MMOL/L — LOW (ref 98–107)
CO2 SERPL-SCNC: 18 MMOL/L — LOW (ref 22–29)
CREAT SERPL-MCNC: 0.92 MG/DL — SIGNIFICANT CHANGE UP (ref 0.5–1.3)
GLUCOSE BLDC GLUCOMTR-MCNC: 143 MG/DL — HIGH (ref 70–99)
GLUCOSE BLDC GLUCOMTR-MCNC: 162 MG/DL — HIGH (ref 70–99)
GLUCOSE BLDC GLUCOMTR-MCNC: 176 MG/DL — HIGH (ref 70–99)
GLUCOSE BLDC GLUCOMTR-MCNC: 251 MG/DL — HIGH (ref 70–99)
GLUCOSE SERPL-MCNC: 115 MG/DL — HIGH (ref 70–99)
HCT VFR BLD CALC: 42.2 % — SIGNIFICANT CHANGE UP (ref 34.5–45)
HGB BLD-MCNC: 13.8 G/DL — SIGNIFICANT CHANGE UP (ref 11.5–15.5)
INR BLD: 1.13 RATIO — SIGNIFICANT CHANGE UP (ref 0.88–1.16)
MAGNESIUM SERPL-MCNC: 2.1 MG/DL — SIGNIFICANT CHANGE UP (ref 1.6–2.6)
MCHC RBC-ENTMCNC: 25.3 PG — LOW (ref 27–34)
MCHC RBC-ENTMCNC: 32.7 GM/DL — SIGNIFICANT CHANGE UP (ref 32–36)
MCV RBC AUTO: 77.3 FL — LOW (ref 80–100)
PHOSPHATE SERPL-MCNC: 4.2 MG/DL — SIGNIFICANT CHANGE UP (ref 2.4–4.7)
PLATELET # BLD AUTO: 622 K/UL — HIGH (ref 150–400)
POTASSIUM SERPL-MCNC: 3.7 MMOL/L — SIGNIFICANT CHANGE UP (ref 3.5–5.3)
POTASSIUM SERPL-SCNC: 3.7 MMOL/L — SIGNIFICANT CHANGE UP (ref 3.5–5.3)
PROT SERPL-MCNC: 6.7 G/DL — SIGNIFICANT CHANGE UP (ref 6.6–8.7)
PROT SERPL-MCNC: 6.7 G/DL — SIGNIFICANT CHANGE UP (ref 6.6–8.7)
PROTHROM AB SERPL-ACNC: 13 SEC — SIGNIFICANT CHANGE UP (ref 10.6–13.6)
RBC # BLD: 5.46 M/UL — HIGH (ref 3.8–5.2)
RBC # FLD: 13.8 % — SIGNIFICANT CHANGE UP (ref 10.3–14.5)
SODIUM SERPL-SCNC: 132 MMOL/L — LOW (ref 135–145)
WBC # BLD: 8.26 K/UL — SIGNIFICANT CHANGE UP (ref 3.8–10.5)
WBC # FLD AUTO: 8.26 K/UL — SIGNIFICANT CHANGE UP (ref 3.8–10.5)

## 2021-03-07 PROCEDURE — 99233 SBSQ HOSP IP/OBS HIGH 50: CPT | Mod: CS

## 2021-03-07 RX ADMIN — Medication 25 MILLIGRAM(S): at 05:58

## 2021-03-07 RX ADMIN — Medication 4 UNIT(S): at 13:33

## 2021-03-07 RX ADMIN — Medication 6 MILLIGRAM(S): at 05:57

## 2021-03-07 RX ADMIN — Medication 100 MILLIGRAM(S): at 05:57

## 2021-03-07 RX ADMIN — ENOXAPARIN SODIUM 80 MILLIGRAM(S): 100 INJECTION SUBCUTANEOUS at 05:57

## 2021-03-07 RX ADMIN — Medication 25 MILLIGRAM(S): at 05:57

## 2021-03-07 RX ADMIN — Medication 4 UNIT(S): at 17:56

## 2021-03-07 RX ADMIN — Medication 2: at 09:24

## 2021-03-07 RX ADMIN — Medication 6: at 13:32

## 2021-03-07 RX ADMIN — INSULIN GLARGINE 16 UNIT(S): 100 INJECTION, SOLUTION SUBCUTANEOUS at 21:50

## 2021-03-07 RX ADMIN — Medication 650 MILLIGRAM(S): at 18:05

## 2021-03-07 RX ADMIN — Medication 2: at 17:56

## 2021-03-07 RX ADMIN — AMLODIPINE BESYLATE 10 MILLIGRAM(S): 2.5 TABLET ORAL at 05:56

## 2021-03-07 RX ADMIN — REMDESIVIR 500 MILLIGRAM(S): 5 INJECTION INTRAVENOUS at 09:08

## 2021-03-07 RX ADMIN — Medication 25 MILLIGRAM(S): at 17:55

## 2021-03-07 RX ADMIN — LISINOPRIL 10 MILLIGRAM(S): 2.5 TABLET ORAL at 05:57

## 2021-03-07 RX ADMIN — ENOXAPARIN SODIUM 80 MILLIGRAM(S): 100 INJECTION SUBCUTANEOUS at 17:55

## 2021-03-07 RX ADMIN — Medication 100 MILLIGRAM(S): at 22:33

## 2021-03-07 RX ADMIN — Medication 4 UNIT(S): at 09:24

## 2021-03-07 RX ADMIN — Medication 100 MILLIGRAM(S): at 13:34

## 2021-03-07 NOTE — PROVIDER CONTACT NOTE (OTHER) - SITUATION
pt BP elevated prior to transfusion 161/84. pt is not due for IVP hydralizine until 18:50pm.
BC results came back negative tonight, pt made aware, saying that is too late now to go home, pts son unable to pick her up at this time, PA aware
BP elevated 176/84; oxygen saturation only 87% on 6L NC.
Patient experienced 3 seconds of non- sustained SVT. Patient is asymptomatic and resting in bed.

## 2021-03-07 NOTE — PROGRESS NOTE ADULT - ASSESSMENT
65F hx HTN, diabetes p/w fever, cough and body aches found to be hypoxic 2/2 covid19. Patient was initially placed on non-rebreather and now transitioned to NC. Was given plasma, tocailizumab and remdesvir. Patient was also noted to be in hypertensive urgency and started on medications.     Covid  - Improving. On 6L NC, continue to wean as able  - S/p convalescent plasma x1  - Initiated Tocilizumab 3/3  - C/w remdesivir/decadron  - ID on board  - Chest CT negative for PE and LE US negative for DVT as of 3/2  - Ddimer elevated- on Full dose AC  - trend inflammatory markers  - C/w Tylenol PRN  - Monitor/  - Supportive care    Hypertensive urgency  - S/p lasix 20mg IV x 1 (2/28)  - c/w HCTZ 25mg daily  - c/w metoprolol tartrate 25 mg BID   - C/w hydralazine 50mg q8 hours for better control  - c/w lisinopril to 10mg QD   - C/w Norvasc 10mg  - Hydralazine 10 mg PRN for SBP >150  - Renal US: Bilaterally increased renal cortical echogenicity compatible with medical renal disease. No hydronephrosis.      Hyponatremia - Resolved  - Received 2L NS in ED  - Likely 2/2 decreased po intake; encouraged PO intake   - Trend BMP    Abnormal UA  - Likely contaminant given moderate epithelials  - Patient denies urinary symptoms  - UCx w/ 10-50K GNR  - Abx not necessary at this time    Diabetes  - Hga1c 7.7  - Not on medication at home per chart  - C/w lantus and admelog  - Monitor accu checks, titrate meds as needed  - Will need glucometer teaching prior to discharge. Will also need to follow up with PMD within 1 week of DC for further mgt   - Consistent carb diet    DVT ppx- Lovenox 40mg QD    #goc- full code    PT: Home  DISPO: Pending clinical improvement, further weaning of O2. Pt ambulates independently so plan for DC home once stable

## 2021-03-07 NOTE — PROVIDER CONTACT NOTE (OTHER) - ACTION/TREATMENT ORDERED:
No intervention at this time. Will continue to monitor.
Continue to monitor, pts son to come tomorrow
start transfusion give IVP when available to give,.
place NRB mask; ABG ordered as well as CT angio chest with IV contrast. CXR. US LE ordered. TTE ordered.

## 2021-03-07 NOTE — PROVIDER CONTACT NOTE (OTHER) - REASON
Discharge
BP elevated 176/84 oxygen saturation only 87% on 6L NC.
Patient had 3 seconds Non-sustained SVT
BP elevated.

## 2021-03-08 LAB
ALBUMIN SERPL ELPH-MCNC: 3.1 G/DL — LOW (ref 3.3–5.2)
ALP SERPL-CCNC: 99 U/L — SIGNIFICANT CHANGE UP (ref 40–120)
ALT FLD-CCNC: 71 U/L — HIGH
AST SERPL-CCNC: 35 U/L — HIGH
BILIRUB DIRECT SERPL-MCNC: 0.1 MG/DL — SIGNIFICANT CHANGE UP (ref 0–0.3)
BILIRUB INDIRECT FLD-MCNC: 0.3 MG/DL — SIGNIFICANT CHANGE UP (ref 0.2–1)
BILIRUB SERPL-MCNC: 0.4 MG/DL — SIGNIFICANT CHANGE UP (ref 0.4–2)
CREAT SERPL-MCNC: 0.91 MG/DL — SIGNIFICANT CHANGE UP (ref 0.5–1.3)
GLUCOSE BLDC GLUCOMTR-MCNC: 111 MG/DL — HIGH (ref 70–99)
GLUCOSE BLDC GLUCOMTR-MCNC: 152 MG/DL — HIGH (ref 70–99)
GLUCOSE BLDC GLUCOMTR-MCNC: 202 MG/DL — HIGH (ref 70–99)
GLUCOSE BLDC GLUCOMTR-MCNC: 331 MG/DL — HIGH (ref 70–99)
HCT VFR BLD CALC: 42.4 % — SIGNIFICANT CHANGE UP (ref 34.5–45)
HGB BLD-MCNC: 13.6 G/DL — SIGNIFICANT CHANGE UP (ref 11.5–15.5)
INR BLD: 1.15 RATIO — SIGNIFICANT CHANGE UP (ref 0.88–1.16)
MCHC RBC-ENTMCNC: 25 PG — LOW (ref 27–34)
MCHC RBC-ENTMCNC: 32.1 GM/DL — SIGNIFICANT CHANGE UP (ref 32–36)
MCV RBC AUTO: 77.8 FL — LOW (ref 80–100)
PLATELET # BLD AUTO: 686 K/UL — HIGH (ref 150–400)
PROT SERPL-MCNC: 6.5 G/DL — LOW (ref 6.6–8.7)
PROTHROM AB SERPL-ACNC: 13.2 SEC — SIGNIFICANT CHANGE UP (ref 10.6–13.6)
RBC # BLD: 5.45 M/UL — HIGH (ref 3.8–5.2)
RBC # FLD: 13.6 % — SIGNIFICANT CHANGE UP (ref 10.3–14.5)
WBC # BLD: 8.18 K/UL — SIGNIFICANT CHANGE UP (ref 3.8–10.5)
WBC # FLD AUTO: 8.18 K/UL — SIGNIFICANT CHANGE UP (ref 3.8–10.5)

## 2021-03-08 PROCEDURE — 99233 SBSQ HOSP IP/OBS HIGH 50: CPT | Mod: CS

## 2021-03-08 PROCEDURE — 99232 SBSQ HOSP IP/OBS MODERATE 35: CPT | Mod: CS

## 2021-03-08 PROCEDURE — 71275 CT ANGIOGRAPHY CHEST: CPT | Mod: 26

## 2021-03-08 RX ORDER — LANOLIN ALCOHOL/MO/W.PET/CERES
3 CREAM (GRAM) TOPICAL ONCE
Refills: 0 | Status: COMPLETED | OUTPATIENT
Start: 2021-03-08 | End: 2021-03-08

## 2021-03-08 RX ADMIN — Medication 4 UNIT(S): at 09:08

## 2021-03-08 RX ADMIN — Medication 100 MILLIGRAM(S): at 05:49

## 2021-03-08 RX ADMIN — Medication 25 MILLIGRAM(S): at 18:26

## 2021-03-08 RX ADMIN — Medication 4 UNIT(S): at 18:24

## 2021-03-08 RX ADMIN — Medication 3 MILLIGRAM(S): at 23:59

## 2021-03-08 RX ADMIN — Medication 25 MILLIGRAM(S): at 05:49

## 2021-03-08 RX ADMIN — Medication 6 MILLIGRAM(S): at 05:49

## 2021-03-08 RX ADMIN — LISINOPRIL 10 MILLIGRAM(S): 2.5 TABLET ORAL at 05:49

## 2021-03-08 RX ADMIN — Medication 4: at 09:07

## 2021-03-08 RX ADMIN — Medication 4 UNIT(S): at 12:49

## 2021-03-08 RX ADMIN — Medication 8: at 12:49

## 2021-03-08 RX ADMIN — Medication 2: at 18:25

## 2021-03-08 RX ADMIN — REMDESIVIR 500 MILLIGRAM(S): 5 INJECTION INTRAVENOUS at 09:35

## 2021-03-08 RX ADMIN — AMLODIPINE BESYLATE 10 MILLIGRAM(S): 2.5 TABLET ORAL at 05:49

## 2021-03-08 RX ADMIN — ENOXAPARIN SODIUM 80 MILLIGRAM(S): 100 INJECTION SUBCUTANEOUS at 05:49

## 2021-03-08 RX ADMIN — ENOXAPARIN SODIUM 80 MILLIGRAM(S): 100 INJECTION SUBCUTANEOUS at 18:25

## 2021-03-08 RX ADMIN — Medication 100 MILLIGRAM(S): at 21:19

## 2021-03-08 RX ADMIN — INSULIN GLARGINE 16 UNIT(S): 100 INJECTION, SOLUTION SUBCUTANEOUS at 21:19

## 2021-03-08 NOTE — PROGRESS NOTE ADULT - ASSESSMENT
This 65F hx HTN, prediabetes p/w fever, cough and body aches. Patient states started feeling sick 5 days ago. She's had body aches, fevers, headache and generalized weakness. She's also had decreased po intake. Her  was admitted here yesterday for covid19. She denies sob, nausea, vomiting, diarrhea, chest pain or abdominal pain. Patient denies dysuria or urinary frequency.   In ER, pt tmax 100, pulse 88 bp 182, 85, RR- 20 and spo2 initially 95 on room air. Patient was given 2L in bolus fluid. Initially planning to be discharged, but then hypoxia worsened to 92 at rest and 91 with ambulation.  (27 Feb 2021 23:07)    patient is confirmed with COVID-19.  CXR with right base opacification.   patient's  is also sick with COVID-198    patient has been here for a few days, was well at first, then started on Remdesivir  3/2/2021    Impression:  COVID-19 infection   Viral pneumonia  shortness of breath  lung infiltrates  dependence on oxygen  feverse    Plan:  s/p Convalescent plasma 3/2/21  s/p Tocilizumab x 1 dose 3/3/21     - continue Remdesivir IV daily.  will plan for a 10 day course.  THRU  3/11/21    - continue dexamethasone 6mg daily, While on remdesivir      LFTs down trending  D-dimer still going up, on Anticoagulation  prior CT angio/ US duplex Lower ext 3/2/2021 negative for PE, and DVT respectively,  BUT in view of ongoing fevers and abnl labs, consider repeat imaging      - Continue supportive care measures  - continue Oxygenation as needed;  CONTINUE to titrate down as tolerated  - self- proning  as tolerated  - ENCOURAGED OOB to chair  - encouraged incentive spirometry      Will follow with you.

## 2021-03-08 NOTE — ADVANCED PRACTICE NURSE CONSULT - RECOMMEDATIONS
after chart review pls cosidner increase lantus to 18 units qhs while pt is on steroids and   admelog5 units before meals  also pls consider dc pt on orals

## 2021-03-08 NOTE — PROGRESS NOTE ADULT - ASSESSMENT
65F hx HTN, diabetes p/w fever, cough and body aches found to be hypoxic 2/2 covid19. Patient was initially placed on non-rebreather and now transitioned to NC. Was given plasma, tocailizumab and remdesvir. Patient was also noted to be in hypertensive urgency and started on medications.     Covid  - On 6L NC, setting 93%   - S/p convalescent plasma x1  - Initiated Tocilizumab 3/3  - C/w remdesivir/decadron  - ID on board  - Chest CT negative for PE and LE US negative for DVT as of 3/2  - Ddimer elevated- on Full dose AC  -due to new onset of fever and uptrend of D-dimer, repeat CTA   - trend inflammatory markers  - C/w Tylenol PRN  - Monitor/  - Supportive care    Hypertensive urgency  - S/p lasix 20mg IV x 1 (2/28)  - c/w HCTZ 25mg daily  - c/w metoprolol tartrate 25 mg BID   - C/w hydralazine 50mg q8 hours for better control  - c/w lisinopril to 10mg QD   - C/w Norvasc 10mg  - Hydralazine 10 mg PRN for SBP >150  - Renal US: Bilaterally increased renal cortical echogenicity compatible with medical renal disease. No hydronephrosis.      Hyponatremia - Resolved  - Received 2L NS in ED  - Likely 2/2 decreased po intake; encouraged PO intake   - Trend BMP    Abnormal UA  - Likely contaminant given moderate epithelials  - Patient denies urinary symptoms  - UCx w/ 10-50K GNR  - Abx not necessary at this time    Diabetes  - Hga1c 7.7  - Not on medication at home per chart  - C/w lantus and admelog  - Monitor accu checks, titrate meds as needed  - Will need glucometer teaching prior to discharge. Will also need to follow up with PMD within 1 week of DC for further mgt   - Consistent carb diet    DVT ppx- Lovenox 40mg QD    #goc- full code    PT: Home  DISPO: Pending clinical improvement, further weaning of O2. Pt ambulates independently so plan for DC home once stable  Updated son belinda Fuchs

## 2021-03-09 LAB
ALBUMIN SERPL ELPH-MCNC: 2.9 G/DL — LOW (ref 3.3–5.2)
ALP SERPL-CCNC: 82 U/L — SIGNIFICANT CHANGE UP (ref 40–120)
ALT FLD-CCNC: 58 U/L — HIGH
ANION GAP SERPL CALC-SCNC: 17 MMOL/L — SIGNIFICANT CHANGE UP (ref 5–17)
AST SERPL-CCNC: 35 U/L — HIGH
BILIRUB DIRECT SERPL-MCNC: 0.1 MG/DL — SIGNIFICANT CHANGE UP (ref 0–0.3)
BILIRUB INDIRECT FLD-MCNC: 0.2 MG/DL — SIGNIFICANT CHANGE UP (ref 0.2–1)
BILIRUB SERPL-MCNC: 0.4 MG/DL — SIGNIFICANT CHANGE UP (ref 0.4–2)
BUN SERPL-MCNC: 25 MG/DL — HIGH (ref 8–20)
CALCIUM SERPL-MCNC: 8.1 MG/DL — LOW (ref 8.6–10.2)
CHLORIDE SERPL-SCNC: 91 MMOL/L — LOW (ref 98–107)
CO2 SERPL-SCNC: 18 MMOL/L — LOW (ref 22–29)
CREAT SERPL-MCNC: 0.89 MG/DL — SIGNIFICANT CHANGE UP (ref 0.5–1.3)
GLUCOSE BLDC GLUCOMTR-MCNC: 129 MG/DL — HIGH (ref 70–99)
GLUCOSE BLDC GLUCOMTR-MCNC: 147 MG/DL — HIGH (ref 70–99)
GLUCOSE BLDC GLUCOMTR-MCNC: 163 MG/DL — HIGH (ref 70–99)
GLUCOSE BLDC GLUCOMTR-MCNC: 79 MG/DL — SIGNIFICANT CHANGE UP (ref 70–99)
GLUCOSE SERPL-MCNC: 97 MG/DL — SIGNIFICANT CHANGE UP (ref 70–99)
HCT VFR BLD CALC: 39.1 % — SIGNIFICANT CHANGE UP (ref 34.5–45)
HGB BLD-MCNC: 12.9 G/DL — SIGNIFICANT CHANGE UP (ref 11.5–15.5)
INR BLD: 1.12 RATIO — SIGNIFICANT CHANGE UP (ref 0.88–1.16)
MCHC RBC-ENTMCNC: 25.6 PG — LOW (ref 27–34)
MCHC RBC-ENTMCNC: 33 GM/DL — SIGNIFICANT CHANGE UP (ref 32–36)
MCV RBC AUTO: 77.6 FL — LOW (ref 80–100)
PLATELET # BLD AUTO: 658 K/UL — HIGH (ref 150–400)
POTASSIUM SERPL-MCNC: 3.6 MMOL/L — SIGNIFICANT CHANGE UP (ref 3.5–5.3)
POTASSIUM SERPL-SCNC: 3.6 MMOL/L — SIGNIFICANT CHANGE UP (ref 3.5–5.3)
PROT SERPL-MCNC: 5.9 G/DL — LOW (ref 6.6–8.7)
PROTHROM AB SERPL-ACNC: 12.9 SEC — SIGNIFICANT CHANGE UP (ref 10.6–13.6)
RBC # BLD: 5.04 M/UL — SIGNIFICANT CHANGE UP (ref 3.8–5.2)
RBC # FLD: 13.8 % — SIGNIFICANT CHANGE UP (ref 10.3–14.5)
SODIUM SERPL-SCNC: 126 MMOL/L — LOW (ref 135–145)
WBC # BLD: 8.32 K/UL — SIGNIFICANT CHANGE UP (ref 3.8–10.5)
WBC # FLD AUTO: 8.32 K/UL — SIGNIFICANT CHANGE UP (ref 3.8–10.5)

## 2021-03-09 PROCEDURE — 99232 SBSQ HOSP IP/OBS MODERATE 35: CPT | Mod: CS

## 2021-03-09 RX ORDER — SODIUM CHLORIDE 0.65 %
1 AEROSOL, SPRAY (ML) NASAL
Refills: 0 | Status: DISCONTINUED | OUTPATIENT
Start: 2021-03-09 | End: 2021-03-10

## 2021-03-09 RX ADMIN — ENOXAPARIN SODIUM 80 MILLIGRAM(S): 100 INJECTION SUBCUTANEOUS at 05:18

## 2021-03-09 RX ADMIN — ENOXAPARIN SODIUM 80 MILLIGRAM(S): 100 INJECTION SUBCUTANEOUS at 17:50

## 2021-03-09 RX ADMIN — Medication 6 MILLIGRAM(S): at 05:18

## 2021-03-09 RX ADMIN — Medication 650 MILLIGRAM(S): at 22:30

## 2021-03-09 RX ADMIN — Medication 100 MILLIGRAM(S): at 05:18

## 2021-03-09 RX ADMIN — Medication 4 UNIT(S): at 08:50

## 2021-03-09 RX ADMIN — INSULIN GLARGINE 16 UNIT(S): 100 INJECTION, SOLUTION SUBCUTANEOUS at 21:13

## 2021-03-09 RX ADMIN — Medication 25 MILLIGRAM(S): at 05:18

## 2021-03-09 RX ADMIN — Medication 100 MILLIGRAM(S): at 14:40

## 2021-03-09 RX ADMIN — REMDESIVIR 500 MILLIGRAM(S): 5 INJECTION INTRAVENOUS at 08:50

## 2021-03-09 RX ADMIN — Medication 25 MILLIGRAM(S): at 17:50

## 2021-03-09 RX ADMIN — LISINOPRIL 10 MILLIGRAM(S): 2.5 TABLET ORAL at 05:18

## 2021-03-09 RX ADMIN — Medication 1 SPRAY(S): at 09:39

## 2021-03-09 RX ADMIN — Medication 4 UNIT(S): at 12:10

## 2021-03-09 RX ADMIN — Medication 2: at 12:10

## 2021-03-09 RX ADMIN — Medication 1 SPRAY(S): at 01:52

## 2021-03-09 RX ADMIN — Medication 4 UNIT(S): at 17:07

## 2021-03-09 RX ADMIN — Medication 650 MILLIGRAM(S): at 21:12

## 2021-03-09 RX ADMIN — Medication 100 MILLIGRAM(S): at 21:12

## 2021-03-09 RX ADMIN — AMLODIPINE BESYLATE 10 MILLIGRAM(S): 2.5 TABLET ORAL at 05:18

## 2021-03-09 NOTE — PROGRESS NOTE ADULT - ASSESSMENT
This 65F hx HTN, prediabetes p/w fever, cough and body aches. Patient states started feeling sick 5 days ago. She's had body aches, fevers, headache and generalized weakness. She's also had decreased po intake. Her  was admitted here yesterday for covid19. She denies sob, nausea, vomiting, diarrhea, chest pain or abdominal pain. Patient denies dysuria or urinary frequency.   In ER, pt tmax 100, pulse 88 bp 182, 85, RR- 20 and spo2 initially 95 on room air. Patient was given 2L in bolus fluid. Initially planning to be discharged, but then hypoxia worsened to 92 at rest and 91 with ambulation.  (27 Feb 2021 23:07)    patient is confirmed with COVID-19.  CXR with right base opacification.   patient's  is also sick with COVID-198    patient has been here for a few days, was well at first, then started on Remdesivir  3/2/2021    Impression:  COVID-19 infection   Viral pneumonia  shortness of breath  lung infiltrates  dependence on oxygen  feverse    Plan:  s/p Convalescent plasma 3/2/21  s/p Tocilizumab x 1 dose 3/3/21     - continue Remdesivir IV daily.  will plan for a 10 day course.  THRU  3/11/21  - continue dexamethasone 6mg daily, While on remdesivir  SLOWLY GETTING BETTER    LFTs down trending  D-dimer still going up, on Anticoagulation  prior CT angio/ US duplex Lower ext 3/2/2021 negative for PE, and DVT respectively,  BUT in view of ongoing fevers and abnl labs, consider repeat imaging    - Continue supportive care measures  - continue Oxygenation as needed;  CONTINUE to titrate down as tolerated  - self- proning  as tolerated  - ENCOURAGED OOB to chair  - encouraged incentive spirometry      Thank you for allowing me to participate in the care of your patient.   Feel free to call me back for any new concerns.      No further specific infectious disease recommendations. Will be available as needed.

## 2021-03-09 NOTE — CHART NOTE - NSCHARTNOTEFT_GEN_A_CORE
Event note     called by RN for elevated blood pressure     pt seen/examined at bedside, NAD. Denies CP/SOB, palpitations, headache, change in vision, + headaches and myalgias for 5 days. Pt states she really doesn't follow up with a primary MD. BP was 189/101 upon arrival to ED and pt was hyponatremic 129, received 2L NS then lasix 20mg and labetolol 10mg IV in ED. Upon arrival to the unit, BP still elevated. Initially 183/83 HR 86.       T(F): 99.8 (21 @ 03:56)  HR: 98 (21 @ 03:56)  BP: 178/79 (21 @ 03:56)  RR: 19 (21 @ 03:56)  SpO2: 92% (21 @ 03:56)    PHYSICAL EXAM:   Neurological: AAOx3, CNII-XII intact,  strength 5/5 b/l  Cardiovascular: RRR, bounding pulses   Respiratory: course bs, diminished bibasilar  Gastrointestinal: soft, NT, ND, BS+, no bruit   Extremities: warm, no dependent edema  Vascular: no cyanosis/erythema  LABS:        129<L>  |  106  |  9.0  ----------------------------<  169<H>  3.7   |  22.0  |  0.86    Ca    8.9      2021 18:39    TPro  7.7  /  Alb  3.7  /  TBili  0.3<L>  /  DBili  x   /  AST  52<H>  /  ALT  62<H>  /  AlkPhos  65    LIVER FUNCTIONS - ( 2021 18:39 )  Alb: 3.7 g/dL / Pro: 7.7 g/dL / ALK PHOS: 65 U/L / ALT: 62 U/L / AST: 52 U/L / GGT: x                               12.9   6.42  )-----------( 218      ( 2021 18:39 )             39.5   CARDIAC MARKERS ( 2021 18:39 )  x     / <0.01 ng/mL / x     / x     / x        Urinalysis Basic - ( 2021 20:30 )    Color: Yellow / Appearance: Clear / S.015 / pH: x  Gluc: x / Ketone: Moderate  / Bili: Negative / Urobili: Negative mg/dL   Blood: x / Protein: 500 mg/dL / Nitrite: Negative   Leuk Esterase: Small / RBC: 3-5 /HPF / WBC 6-10   Sq Epi: x / Non Sq Epi: Moderate / Bacteria: Few    A/P: 65F hx HTN, prediabetes p/w fever, cough and body aches. Patient states started feeling sick 5 days ago. She's had body aches, fevers, headache and generalized weakness.  1. Uncontrolled Hypertension multifactorial most likely related to medication nonadherence, fluid resuscitation, less likely Renal artery stenosis  -hydralazine 10mg IVP given, bp repeated with minimal improvement   -amlodopine 10mg po given   -enalaprilit 1.25mg IVP added, repeat vs in 30 min  -captopril 25mg q 8 hrs (hold sbp<120)  -consider renal sono                     above event discussed with Attg
PA note  Called that patient was de-sating into the mid 80's. She was placed on 3L O2 and is now in the 90's.   was placed.  General: Patient was laying on her side, NAD noted  Lungs: Clear to A  Cardiac: S1 and S1  A/P: De-sating         -Continue O2 PRN         -
Patient with 3 secs of SVT nonsustained   Patient now NSR   VSS, asymptomatic  Mag, BMP, Phos ordered and pending   RN to notify with any acute changes
Patient with elevated D-dimer of 2586  D-Dimer ordered by day team as patient had increased oxygen requirements   Lovenox changed to full dose anticoagulation   Would consider CTA in AM if patient's O2 remains stable   RN to notify with any acute changes
Called by RN after pt desat to mid 80s on 6L O2; added NRB as previously ordered, placed pt prone, saturation improved to 93%.  RN to monitor and escalate for change in status.
Source: Patient [ ]  Family [ ]   other [x ]    Current Diet: Consistent CHO with Glucerna shake BID      PO intake:  < 50% [ ]   50-75%  [ ]   %  [ ]  other : ??    Source for PO intake [ ] Patient [ ] family [x ] chart [ ] staff [ ] other    Enteral /Parenteral Nutrition:     Current Weight: 179.8# 2/27    % Weight Change     Pertinent Medications: MEDICATIONS  (STANDING):  amLODIPine   Tablet 10 milliGRAM(s) Oral daily  dexAMETHasone     Tablet 6 milliGRAM(s) Oral daily  dextrose 40% Gel 15 Gram(s) Oral once  dextrose 5%. 1000 milliLiter(s) (50 mL/Hr) IV Continuous <Continuous>  dextrose 5%. 1000 milliLiter(s) (100 mL/Hr) IV Continuous <Continuous>  dextrose 50% Injectable 25 Gram(s) IV Push once  dextrose 50% Injectable 12.5 Gram(s) IV Push once  dextrose 50% Injectable 25 Gram(s) IV Push once  enoxaparin Injectable 80 milliGRAM(s) SubCutaneous two times a day  glucagon  Injectable 1 milliGRAM(s) IntraMuscular once  hydrALAZINE 100 milliGRAM(s) Oral every 8 hours  hydrochlorothiazide 25 milliGRAM(s) Oral daily  insulin glargine Injectable (LANTUS) 16 Unit(s) SubCutaneous at bedtime  insulin lispro (ADMELOG) corrective regimen sliding scale   SubCutaneous three times a day before meals  insulin lispro (ADMELOG) corrective regimen sliding scale   SubCutaneous at bedtime  insulin lispro Injectable (ADMELOG) 4 Unit(s) SubCutaneous three times a day with meals  lisinopril 10 milliGRAM(s) Oral daily  metoprolol tartrate 25 milliGRAM(s) Oral two times a day  remdesivir  IVPB 100 milliGRAM(s) IV Intermittent every 24 hours    MEDICATIONS  (PRN):  acetaminophen   Tablet .. 650 milliGRAM(s) Oral every 6 hours PRN Temp greater or equal to 38C (100.4F), Mild Pain (1 - 3), Moderate Pain (4 - 6)  hydrALAZINE Injectable 10 milliGRAM(s) IV Push every 6 hours PRN SBP > 150  sodium chloride 0.65% Nasal 1 Spray(s) Both Nostrils four times a day PRN Nasal Congestion    Pertinent Labs: CBC Full  -  ( 09 Mar 2021 07:16 )  WBC Count : 8.32 K/uL  RBC Count : 5.04 M/uL  Hemoglobin : 12.9 g/dL  Hematocrit : 39.1 %  Platelet Count - Automated : 658 K/uL  Mean Cell Volume : 77.6 fl  Mean Cell Hemoglobin : 25.6 pg  Mean Cell Hemoglobin Concentration : 33.0 gm/dL  Auto Neutrophil # : x  Auto Lymphocyte # : x  Auto Monocyte # : x  Auto Eosinophil # : x  Auto Basophil # : x  Auto Neutrophil % : x  Auto Lymphocyte % : x  Auto Monocyte % : x  Auto Eosinophil % : x  Auto Basophil % : x      03-09 Nan/a   Glu n/a   K+ n/a   Cr  n/a   BUN n/a   Phos n/a   Alb 2.9 g/dL<L> PAB n/a           Skin:     Nutrition focused physical exam not conducted - found signs of malnutrition [ ]absent [ ]present    Subcutaneous fat loss: [ ] Orbital fat pads region, [ ]Buccal fat region, [ ]Triceps region,  [ ]Ribs region    Muscle wasting: [ ]Temples region, [ ]Clavicle region, [ ]Shoulder region, [ ]Scapula region, [ ]Interosseous region,  [ ]thigh region, [ ]Calf region    Estimated Needs:   [ x] no change since previous assessment  [ ] recalculated:     Current Nutrition Diagnosis: Increased Nutrient Needs related to increased physiological demand to promote healing as evidenced by covid +.  DISPO: Pending clinical improvement, further weaning of O2. Plan is home.         Recommendations: Continue Glucerna shake BID.   Rec Thiamine, MVI, Vit C, Vit D    Monitoring and Evaluation:   [x ] PO intake [x ] Tolerance to diet prescription [X] Weights  [X] Follow up per protocol [X] Labs:

## 2021-03-09 NOTE — PROGRESS NOTE ADULT - ASSESSMENT
65F hx HTN, diabetes p/w fever, cough and body aches found to be hypoxic 2/2 covid19. Patient was initially placed on non-rebreather and now transitioned to NC. Was given plasma, tocailizumab and remdesvir. Patient was also noted to be in hypertensive urgency and started on medications.     Covid  - On 3L NC, setting 94%   - S/p convalescent plasma x1  - Initiated Tocilizumab 3/3  - C/w remdesivir/decadron  - ID on board  - Chest CT negative for PE and LE US negative for DVT as of 3/2  - Ddimer elevated- on Full dose AC  -repeat CTA neg for PE   - trend inflammatory markers  - C/w Tylenol PRN  - Monitor/  - Supportive care    Hypertensive urgency  - S/p lasix 20mg IV x 1 (2/28)  - c/w HCTZ 25mg daily  - c/w metoprolol tartrate 25 mg BID   - C/w hydralazine 50mg q8 hours for better control  - c/w lisinopril to 10mg QD   - C/w Norvasc 10mg  - Hydralazine 10 mg PRN for SBP >150  - Renal US: Bilaterally increased renal cortical echogenicity compatible with medical renal disease. No hydronephrosis.      Hyponatremia - Resolved  - Received 2L NS in ED  - Likely 2/2 decreased po intake; encouraged PO intake   - Trend BMP    Abnormal UA  - Likely contaminant given moderate epithelials  - Patient denies urinary symptoms  - UCx w/ 10-50K GNR  - Abx not necessary at this time    Diabetes  - Hga1c 7.7  - Not on medication at home per chart  - C/w lantus and admelog  - Monitor accu checks, titrate meds as needed  - Will need glucometer teaching prior to discharge. Will also need to follow up with PMD within 1 week of DC for further mgt   - Consistent carb diet    DVT ppx- Lovenox 40mg QD    #goc- full code    PT: Home v. [facility]   Updated son w Jef

## 2021-03-10 VITALS — SYSTOLIC BLOOD PRESSURE: 102 MMHG | DIASTOLIC BLOOD PRESSURE: 67 MMHG | HEART RATE: 85 BPM

## 2021-03-10 LAB
GLUCOSE BLDC GLUCOMTR-MCNC: 200 MG/DL — HIGH (ref 70–99)
GLUCOSE BLDC GLUCOMTR-MCNC: 230 MG/DL — HIGH (ref 70–99)
GLUCOSE BLDC GLUCOMTR-MCNC: 99 MG/DL — SIGNIFICANT CHANGE UP (ref 70–99)
SARS-COV-2 RNA SPEC QL NAA+PROBE: SIGNIFICANT CHANGE UP

## 2021-03-10 PROCEDURE — 99238 HOSP IP/OBS DSCHRG MGMT 30/<: CPT

## 2021-03-10 RX ORDER — INSULIN GLARGINE 100 [IU]/ML
16 INJECTION, SOLUTION SUBCUTANEOUS
Qty: 0 | Refills: 0 | DISCHARGE
Start: 2021-03-10

## 2021-03-10 RX ORDER — HYDRALAZINE HCL 50 MG
1 TABLET ORAL
Qty: 0 | Refills: 0 | DISCHARGE
Start: 2021-03-10

## 2021-03-10 RX ORDER — DEXAMETHASONE 0.5 MG/5ML
1 ELIXIR ORAL
Qty: 0 | Refills: 0 | DISCHARGE
Start: 2021-03-10

## 2021-03-10 RX ORDER — ENOXAPARIN SODIUM 100 MG/ML
40 INJECTION SUBCUTANEOUS
Qty: 0 | Refills: 0 | DISCHARGE
Start: 2021-03-10

## 2021-03-10 RX ORDER — SODIUM CHLORIDE 9 MG/ML
1000 INJECTION INTRAMUSCULAR; INTRAVENOUS; SUBCUTANEOUS
Refills: 0 | Status: DISCONTINUED | OUTPATIENT
Start: 2021-03-10 | End: 2021-03-10

## 2021-03-10 RX ADMIN — AMLODIPINE BESYLATE 10 MILLIGRAM(S): 2.5 TABLET ORAL at 08:01

## 2021-03-10 RX ADMIN — Medication 100 MILLIGRAM(S): at 08:01

## 2021-03-10 RX ADMIN — Medication 6 MILLIGRAM(S): at 08:01

## 2021-03-10 RX ADMIN — REMDESIVIR 500 MILLIGRAM(S): 5 INJECTION INTRAVENOUS at 10:07

## 2021-03-10 RX ADMIN — Medication 25 MILLIGRAM(S): at 08:01

## 2021-03-10 RX ADMIN — Medication 4 UNIT(S): at 17:36

## 2021-03-10 RX ADMIN — ENOXAPARIN SODIUM 80 MILLIGRAM(S): 100 INJECTION SUBCUTANEOUS at 08:01

## 2021-03-10 RX ADMIN — ENOXAPARIN SODIUM 80 MILLIGRAM(S): 100 INJECTION SUBCUTANEOUS at 17:37

## 2021-03-10 RX ADMIN — Medication 4: at 13:41

## 2021-03-10 RX ADMIN — Medication 2: at 17:36

## 2021-03-10 RX ADMIN — Medication 100 MILLIGRAM(S): at 13:42

## 2021-03-10 RX ADMIN — Medication 4 UNIT(S): at 13:41

## 2021-03-10 RX ADMIN — LISINOPRIL 10 MILLIGRAM(S): 2.5 TABLET ORAL at 08:01

## 2021-03-10 NOTE — PROGRESS NOTE ADULT - ASSESSMENT
65F hx HTN, diabetes p/w fever, cough and body aches found to be hypoxic 2/2 covid19. Patient was initially placed on non-rebreather and now transitioned to NC. Was given plasma, tocailizumab and remdesvir. Patient was also noted to be in hypertensive urgency and started on medications.     Covid  - On 3L NC, setting 92%   - S/p convalescent plasma x1  - Initiated Tocilizumab 3/3  - C/w remdesivir/decadron  - ID on board  - Chest CT negative for PE and LE US negative for DVT as of 3/2  - Ddimer elevated- on Full dose AC  -repeat CTA neg for PE   - trend inflammatory markers  - C/w Tylenol PRN  - Monitor/  - Supportive care    Hypertensive urgency  - S/p lasix 20mg IV x 1 (2/28)  - c/w HCTZ 25mg daily  - c/w metoprolol tartrate 25 mg BID   - C/w hydralazine 50mg q8 hours for better control  - c/w lisinopril to 10mg QD   - C/w Norvasc 10mg  - Hydralazine 10 mg PRN for SBP >150  - Renal US: Bilaterally increased renal cortical echogenicity compatible with medical renal disease. No hydronephrosis.      Hyponatremia - Resolved  - Received 2L NS in ED  - Likely 2/2 decreased po intake; encouraged PO intake   - Trend BMP    Abnormal UA  - Likely contaminant given moderate epithelials  - Patient denies urinary symptoms  - UCx w/ 10-50K GNR  - Abx not necessary at this time    Diabetes  - Hga1c 7.7  - Not on medication at home per chart  - C/w lantus and admelog  - Monitor accu checks, titrate meds as needed  - Will need glucometer teaching prior to discharge. Will also need to follow up with PMD within 1 week of DC for further mgt   - Consistent carb diet    DVT ppx- Lovenox 40mg QD    #goc- full code    PT: Home v. [facility]   son Jef not picking up. LVM

## 2021-03-10 NOTE — PROGRESS NOTE ADULT - SUBJECTIVE AND OBJECTIVE BOX
[facility]  INFECTIOUS DISEASES AND INTERNAL MEDICINE at [place]  =======================================================  Darvin Carter MD  Diplomates American Board of Internal Medicine and Infectious Diseases  Tel  [phone]  Fax [phone]  =======================================================    [id number]  BRADY GBEMI  follow up: COVID-19    still on non rebreather  was taken to 6L NC this AM and desaturated         Social Hx:  =======  no toxic habits currently    FAMILY HISTORY:  No pertinent family history in first degree relatives    no significant family history of immunosuppressive disorders in mother or father   =======================================================    REVIEW OF SYSTEMS:  CONSTITUTIONAL:  No Fever or chills  HEENT:  No diplopia or blurred vision.  No earache, sore throat or runny nose.  CARDIOVASCULAR:  No pressure, squeezing, strangling, tightness, heaviness or aching about the chest, neck, axilla or epigastrium.  RESPIRATORY: + SOB  GASTROINTESTINAL:  No nausea, vomiting or diarrhea.  GENITOURINARY:  No dysuria, frequency or urgency. No Blood in urine  MUSCULOSKELETAL:  no joint aches, no muscle pain  SKIN:  No change in skin, hair or nails.  NEUROLOGIC:  No Headaches, seizures or weakness.  PSYCHIATRIC:  No disorder of thought or mood.  ENDOCRINE:  No heat or cold intolerance  HEMATOLOGICAL:  No easy bruising or bleeding.    =======================================================  Allergies  No Known Allergies       ======================================================  Physical Exam:  ============    General:  No acute distress.  OBESE  Eye: Pupils are equal, round and reactive to light, Extraocular movements are intact, Normal conjunctiva.  HENT: Normocephalic, Oral mucosa is moist, No pharyngeal erythema, No sinus tenderness.  Neck: Supple, No lymphadenopathy.  Respiratory: Lungs with poor aeration  at bases  Cardiovascular: Normal rate, Regular rhythm,   Gastrointestinal: Soft, Non-tender, Non-distended, Normal bowel sounds.  Genitourinary: No costovertebral angle tenderness.  Lymphatics: No lymphadenopathy neck,   Musculoskeletal: Normal range of motion, Normal strength.  Integumentary: No rash.  Neurologic: Alert, Oriented, No focal deficits, Cranial Nerves II-XII are grossly intact.  Psychiatric: Appropriate mood & affect.    =======================================================  Vitals:  ============  T(F): 99 (03 Mar 2021 09:22), Max: 100.1 (02 Mar 2021 12:30)  HR: 92 (03 Mar 2021 09:22)  BP: 159/80 (03 Mar 2021 09:22)  RR: 20 (03 Mar 2021 10:04)  SpO2: 92% (03 Mar 2021 10:04) (85% - 95%)  temp max in last 48H T(F): , Max: 102.8 (03-01-21 @ 16:46)    =======================================================  Current Antibiotics:  remdesivir  IVPB   IV Intermittent   remdesivir  IVPB 100 milliGRAM(s) IV Intermittent every 24 hours    Other medications:  amLODIPine   Tablet 10 milliGRAM(s) Oral daily  dexAMETHasone     Tablet 6 milliGRAM(s) Oral daily  dextrose 40% Gel 15 Gram(s) Oral once  dextrose 5%. 1000 milliLiter(s) IV Continuous <Continuous>  dextrose 5%. 1000 milliLiter(s) IV Continuous <Continuous>  dextrose 50% Injectable 25 Gram(s) IV Push once  dextrose 50% Injectable 12.5 Gram(s) IV Push once  dextrose 50% Injectable 25 Gram(s) IV Push once  enoxaparin Injectable 40 milliGRAM(s) SubCutaneous daily  glucagon  Injectable 1 milliGRAM(s) IntraMuscular once  hydrALAZINE 25 milliGRAM(s) Oral every 8 hours  insulin lispro (ADMELOG) corrective regimen sliding scale   SubCutaneous three times a day before meals  insulin lispro (ADMELOG) corrective regimen sliding scale   SubCutaneous at bedtime  lisinopril 10 milliGRAM(s) Oral daily      =======================================================  Labs:                        11.7   10.56 )-----------( 329      ( 03 Mar 2021 07:07 )             36.2     03-03    132<L>  |  98  |  18.0  ----------------------------<  178<H>  4.2   |  21.0<L>  |  0.96    Ca    8.5<L>      03 Mar 2021 07:07    TPro  7.0  /  Alb  3.0<L>  /  TBili  0.4  /  DBili  0.2  /  AST  49<H>  /  ALT  57<H>  /  AlkPhos  98  03-03      Culture - Urine (collected 02-28-21 @ 02:38)  Source: .Urine Clean Catch (Midstream)  Final Report (03-02-21 @ 14:29):    10,000 - 49,000 CFU/mL Escherichia coli    Normal Urogenital nicolas present  Organism: Escherichia coli (03-02-21 @ 14:29)  Organism: Escherichia coli (03-02-21 @ 14:29)    Sensitivities:      -  Amikacin: S <=16      -  Amoxicillin/Clavulanic Acid: S <=8/4      -  Ampicillin: S <=8 These ampicillin results predict results for amoxicillin      -  Ampicillin/Sulbactam: S <=4/2 Enterobacter, Citrobacter, and Serratia may develop resistance during prolonged therapy (3-4 days)      -  Aztreonam: S <=4      -  Cefazolin: S <=2 (MIC_CL_COM_ENTERIC_CEFAZU) For uncomplicated UTI with K. pneumoniae, E. coli, or P. mirablis: CHIKI <=16 is sensitive and CHIKI >=32 is resistant. This also predicts results for oral agents cefaclor, cefdinir, cefpodoxime, cefprozil, cefuroxime axetil, cephalexin and locarbef for uncomplicated UTI. Note that some isolates may be susceptible to these agents while testing resistant to cefazolin.      -  Cefepime: S <=2      -  Cefoxitin: S <=8      -  Ceftriaxone: S <=1 Enterobacter, Citrobacter, and Serratia may develop resistance during prolonged therapy      -  Ciprofloxacin: R >2      -  Ertapenem: S <=0.5      -  Gentamicin: S <=2      -  Imipenem: S <=1      -  Levofloxacin: R 4      -  Meropenem: S <=1      -  Nitrofurantoin: S <=32 Should not be used to treat pyelonephritis      -  Piperacillin/Tazobactam: S <=8      -  Tigecycline: S <=2      -  Tobramycin: S <=2      -  Trimethoprim/Sulfamethoxazole: S <=0.5/9.5      Method Type: CHIKI    Culture - Blood (collected 02-27-21 @ 19:16)  Source: .Blood Blood-Peripheral    Culture - Blood (collected 02-27-21 @ 19:16)  Source: .Blood Blood-Peripheral      Creatinine, Serum: 0.96 mg/dL (03-03-21 @ 07:07)  Creatinine, Serum: 1.00 mg/dL (03-02-21 @ 07:47)  Creatinine, Serum: 1.00 mg/dL (03-02-21 @ 07:47)  Creatinine, Serum: 0.86 mg/dL (03-01-21 @ 09:44)  Creatinine, Serum: 0.82 mg/dL (02-28-21 @ 14:11)  Creatinine, Serum: 1.05 mg/dL (02-28-21 @ 08:20)  Creatinine, Serum: 0.86 mg/dL (02-27-21 @ 18:39)    Procalcitonin, Serum: 0.79 ng/mL (03-02-21 @ 07:47)  Procalcitonin, Serum: 0.36 ng/mL (02-27-21 @ 18:39)    D-Dimer Assay, Quantitative: 1814 ng/mL DDU (03-03-21 @ 07:07)  D-Dimer Assay, Quantitative: 1746 ng/mL DDU (03-02-21 @ 07:48)  D-Dimer Assay, Quantitative: 165 ng/mL DDU (02-27-21 @ 18:39)    Ferritin, Serum: 667 ng/mL (03-03-21 @ 07:07)  Ferritin, Serum: 550 ng/mL (03-02-21 @ 07:47)  Ferritin, Serum: 298 ng/mL (02-27-21 @ 18:39)    C-Reactive Protein, Serum: 10.71 mg/dL (02-27-21 @ 18:39)    WBC Count: 10.56 K/uL (03-03-21 @ 07:07)  WBC Count: 13.02 K/uL (03-02-21 @ 07:49)  WBC Count: 12.08 K/uL (03-01-21 @ 09:44)  WBC Count: 6.18 K/uL (02-28-21 @ 08:20)  WBC Count: 6.42 K/uL (02-27-21 @ 18:39)    Rapid RVP Result: Detected (02-27-21 @ 19:03)  SARS-CoV-2: Detected (02-27-21 @ 19:03)    COVID-19 IgG Antibody Index: 0.10 Index (02-28-21 @ 12:10)  COVID-19 IgG Antibody Interpretation: Negative (02-28-21 @ 12:10)    Lactate Dehydrogenase, Serum: 481 U/L (03-03-21 @ 07:07)  Lactate Dehydrogenase, Serum: 533 U/L (03-02-21 @ 07:47)    Alkaline Phosphatase, Serum: 98 U/L (03-03-21 @ 07:07)  Alkaline Phosphatase, Serum: 103 U/L (03-03-21 @ 07:07)  Alkaline Phosphatase, Serum: 104 U/L (03-02-21 @ 07:47)  Alkaline Phosphatase, Serum: 104 U/L (03-02-21 @ 07:47)  Alkaline Phosphatase, Serum: 63 U/L (03-01-21 @ 09:44)  Alkaline Phosphatase, Serum: 58 U/L (02-28-21 @ 08:20)  Alkaline Phosphatase, Serum: 65 U/L (02-27-21 @ 18:39)  Alanine Aminotransferase (ALT/SGPT): 57 U/L (03-03-21 @ 07:07)  Alanine Aminotransferase (ALT/SGPT): 57 U/L (03-03-21 @ 07:07)  Alanine Aminotransferase (ALT/SGPT): 79 U/L (03-02-21 @ 07:47)  Alanine Aminotransferase (ALT/SGPT): 79 U/L (03-02-21 @ 07:47)  Alanine Aminotransferase (ALT/SGPT): 52 U/L (03-01-21 @ 09:44)  Alanine Aminotransferase (ALT/SGPT): 49 U/L (02-28-21 @ 08:20)  Alanine Aminotransferase (ALT/SGPT): 62 U/L (02-27-21 @ 18:39)  Aspartate Aminotransferase (AST/SGOT): 49 U/L (03-03-21 @ 07:07)  Aspartate Aminotransferase (AST/SGOT): 49 U/L (03-03-21 @ 07:07)  Aspartate Aminotransferase (AST/SGOT): 94 U/L (03-02-21 @ 07:47)  Aspartate Aminotransferase (AST/SGOT): 94 U/L (03-02-21 @ 07:47)  Aspartate Aminotransferase (AST/SGOT): 54 U/L (03-01-21 @ 09:44)  Aspartate Aminotransferase (AST/SGOT): 41 U/L (02-28-21 @ 08:20)  Aspartate Aminotransferase (AST/SGOT): 52 U/L (02-27-21 @ 18:39)  Bilirubin Total, Serum: 0.4 mg/dL (03-03-21 @ 07:07)  Bilirubin Total, Serum: 0.4 mg/dL (03-03-21 @ 07:07)  Bilirubin Total, Serum: 0.7 mg/dL (03-02-21 @ 07:47)  Bilirubin Total, Serum: 0.7 mg/dL (03-02-21 @ 07:47)  Bilirubin Total, Serum: 0.3 mg/dL (03-01-21 @ 09:44)  Bilirubin Total, Serum: 0.2 mg/dL (02-28-21 @ 08:20)  Bilirubin Total, Serum: 0.3 mg/dL (02-27-21 @ 18:39)  Bilirubin Direct, Serum: 0.2 mg/dL (03-03-21 @ 07:07)  Bilirubin Direct, Serum: 0.4 mg/dL (03-02-21 @ 07:47)      < from: Xray Chest 1 View-PORTABLE IMMEDIATE (02.27.21 @ 18:58) >   EXAM:  XR CHEST PORTABLE IMMED 1V                          PROCEDURE DATE:  02/27/2021          INTERPRETATION:  AP chest.    Clinical indications: Shortness of breath.    IMPRESSION: There is minimal right base opacification that may be infectious in nature. Cardiac silhouette is upper limits of normal in size. The left lung is clear.         NICO LEMOS MD; Attending Radiologist  This document has been electronically signed. Feb 28 2021  8:31AM    < end of copied text >         
[facility] Division of Hospital Medicine    Chief Complaint:  Patient is a 65y old  Female who presents with a chief complaint of covid19, hypoxia (04 Mar 2021 13:34)      SUBJECTIVE / OVERNIGHT EVENTS:  Patient was seen and examined at bedside. Patient states that her breathing is improved. BP remains elevated.   Patient denies chest pain, SOB, abd pain, N/V, fever, chills, dysuria or any other complaints. All remainder ROS negative.     MEDICATIONS  (STANDING):  amLODIPine   Tablet 10 milliGRAM(s) Oral daily  dexAMETHasone     Tablet 6 milliGRAM(s) Oral daily  dextrose 40% Gel 15 Gram(s) Oral once  dextrose 5%. 1000 milliLiter(s) (50 mL/Hr) IV Continuous <Continuous>  dextrose 5%. 1000 milliLiter(s) (100 mL/Hr) IV Continuous <Continuous>  dextrose 50% Injectable 25 Gram(s) IV Push once  dextrose 50% Injectable 12.5 Gram(s) IV Push once  dextrose 50% Injectable 25 Gram(s) IV Push once  enoxaparin Injectable 40 milliGRAM(s) SubCutaneous daily  glucagon  Injectable 1 milliGRAM(s) IntraMuscular once  hydrALAZINE 50 milliGRAM(s) Oral every 8 hours  hydrochlorothiazide 25 milliGRAM(s) Oral daily  insulin glargine Injectable (LANTUS) 10 Unit(s) SubCutaneous at bedtime  insulin lispro (ADMELOG) corrective regimen sliding scale   SubCutaneous three times a day before meals  insulin lispro (ADMELOG) corrective regimen sliding scale   SubCutaneous at bedtime  insulin lispro Injectable (ADMELOG) 4 Unit(s) SubCutaneous three times a day with meals  lisinopril 10 milliGRAM(s) Oral daily  remdesivir  IVPB 100 milliGRAM(s) IV Intermittent every 24 hours  remdesivir  IVPB   IV Intermittent     MEDICATIONS  (PRN):  acetaminophen   Tablet .. 650 milliGRAM(s) Oral every 6 hours PRN Temp greater or equal to 38C (100.4F), Mild Pain (1 - 3), Moderate Pain (4 - 6)  hydrALAZINE Injectable 10 milliGRAM(s) IV Push every 6 hours PRN SBP > 150        I&O's Summary    04 Mar 2021 07:01  -  05 Mar 2021 07:00  --------------------------------------------------------  IN: 250 mL / OUT: 200 mL / NET: 50 mL        PHYSICAL EXAM:  Vital Signs Last 24 Hrs  T(C): 36.8 (05 Mar 2021 04:05), Max: 36.8 (04 Mar 2021 16:15)  T(F): 98.2 (05 Mar 2021 04:05), Max: 98.3 (04 Mar 2021 16:15)  HR: 90 (05 Mar 2021 07:59) (90 - 98)  BP: 166/80 (05 Mar 2021 06:45) (163/79 - 182/92)  BP(mean): --  RR: 18 (05 Mar 2021 04:05) (18 - 20)  SpO2: 93% (05 Mar 2021 07:59) (90% - 93%)      Constitutional: NAD, In BED on NC  ENT: Supple, No JVD  Lungs: CTA B/L, Non-labored breathing  Cardio: RRR, S1/S2, No murmur  Abdomen: Soft, Nontender, Nondistended; Bowel sounds present  Extremities: No calf tenderness, No pitting edema  Musculoskeletal:   No clubbing or cyanosis of digits; no joint swelling or tenderness to palpation  Psych: A+O to person, place, and time; affect appropriate  Neuro: CN 2-12 are intact and symmetric; no gross sensory deficits;   Skin: No rashes; no palpable lesions    LABS:                        12.9   6.96  )-----------( 515      ( 05 Mar 2021 08:17 )             39.9     03-05    135  |  99  |  25.0<H>  ----------------------------<  207<H>  4.4   |  18.0<L>  |  0.85    Ca    8.5<L>      05 Mar 2021 08:17    TPro  6.9  /  Alb  2.9<L>  /  TBili  0.4  /  DBili  0.2  /  AST  136<H>  /  ALT  97<H>  /  AlkPhos  108  03-05    PT/INR - ( 05 Mar 2021 08:17 )   PT: 11.3 sec;   INR: 0.97 ratio                   CAPILLARY BLOOD GLUCOSE      POCT Blood Glucose.: 232 mg/dL (05 Mar 2021 08:33)  POCT Blood Glucose.: 210 mg/dL (04 Mar 2021 21:21)  POCT Blood Glucose.: 302 mg/dL (04 Mar 2021 16:22)  POCT Blood Glucose.: 267 mg/dL (04 Mar 2021 11:31)        RADIOLOGY REVIEWED  
CC:     Pt seen and examined at bedside by PA  Pt laying in bed, flat affect  Complains of cough, otherwise no specific complaints  Weaned from NRB to 5LNC   Denies N/V/D/C, HA, CP, SOB, abd pain, dysuria, hematuria, leg swelling/pain  ROS Neg  VSS on 5L NC    Allergies  No Known Allergies  Intolerances    HEALTH ISSUES - PROBLEM Dx:  PAST MEDICAL & SURGICAL HISTORY:  Prediabetes  Hypertension  No significant past surgical history    MEDICATIONS  (STANDING):  amLODIPine   Tablet 10 milliGRAM(s) Oral daily  dexAMETHasone     Tablet 6 milliGRAM(s) Oral daily  dextrose 40% Gel 15 Gram(s) Oral once  dextrose 5%. 1000 milliLiter(s) (50 mL/Hr) IV Continuous <Continuous>  dextrose 5%. 1000 milliLiter(s) (100 mL/Hr) IV Continuous <Continuous>  dextrose 50% Injectable 25 Gram(s) IV Push once  dextrose 50% Injectable 12.5 Gram(s) IV Push once  dextrose 50% Injectable 25 Gram(s) IV Push once  enoxaparin Injectable 40 milliGRAM(s) SubCutaneous daily  glucagon  Injectable 1 milliGRAM(s) IntraMuscular once  hydrALAZINE 25 milliGRAM(s) Oral every 8 hours  insulin lispro (ADMELOG) corrective regimen sliding scale   SubCutaneous three times a day before meals  insulin lispro (ADMELOG) corrective regimen sliding scale   SubCutaneous at bedtime  lisinopril 10 milliGRAM(s) Oral daily  remdesivir  IVPB   IV Intermittent   remdesivir  IVPB 100 milliGRAM(s) IV Intermittent every 24 hours    MEDICATIONS  (PRN):  acetaminophen   Tablet .. 650 milliGRAM(s) Oral every 6 hours PRN Temp greater or equal to 38C (100.4F), Mild Pain (1 - 3), Moderate Pain (4 - 6)  hydrALAZINE Injectable 10 milliGRAM(s) IV Push every 6 hours PRN SBP > 150    Vital Signs Last 24 Hrs  T(C): 36.6 (04 Mar 2021 09:16), Max: 37.4 (03 Mar 2021 16:32)  T(F): 97.9 (04 Mar 2021 09:16), Max: 99.3 (03 Mar 2021 16:32)  HR: 85 (04 Mar 2021 09:16) (78 - 99)  BP: 163/90 (04 Mar 2021 09:16) (163/75 - 174/79)  BP(mean): --  RR: 20 (04 Mar 2021 09:16) (18 - 20)  SpO2: 90% (04 Mar 2021 09:16) (85% - 94%) on 5L NC    PHYSICAL EXAM:  GENERAL: Pt lying in bed comfortably in NAD on 5L NC, speaks in full sentences   HEAD: Atraumatic  EYES: conjunctiva and sclera clear  ENT: Moist mucous membranes  NECK: Supple, No JVD  CHEST/LUNG: Clear to auscultation bilaterally, Non-labored respirations  HEART: Regular rate and rhythm, S1S2  ABDOMEN: Bowel sounds present; Soft, Nontender, Nondistended. No guarding or rigidity   EXTREMITIES: No LE edema or tenderness  NERVOUS SYSTEM:  Alert & Oriented X3, speech clear, answers questions appropriately.  No deficits though flat affect   SKIN: Warm and dry     LABS:                      12.5   7.67  )-----------( 399      ( 04 Mar 2021 07:57 )             38.8   03-04    132<L>  |  96<L>  |  23.0<H>  ----------------------------<  219<H>  4.6   |  21.0<L>  |  0.85    Ca    8.7      04 Mar 2021 07:57    TPro  7.1  /  Alb  2.9<L>  /  TBili  0.4  /  DBili  0.1  /  AST  51<H>  /  ALT  49<H>  /  AlkPhos  110  03-04          CT Angio Chest w/ IV Cont (03.02.21 @ 13:28) >  IMPRESSION:  *  Limited study for pulmonary embolism. No pulmonary embolism to the proximal segmental branches. Limited visualization of distal segmental and subsegmental branches secondary to a combination of respiratory motion and suboptimal bolus timing.  *  Moderate bilateral groundglass and consolidative opacities compatible with COVID-19 pneumonia.                    
[facility]  INFECTIOUS DISEASES AND INTERNAL MEDICINE at [place]  =======================================================  Darvin Carter MD  Diplomates American Board of Internal Medicine and Infectious Diseases  Tel  [phone]  Fax [phone]  =======================================================    [id number]  BRADY GBEMI  follow up: COVID-19    remains on nasal cannula  minimal SOB      Social Hx:  =======  no toxic habits currently    FAMILY HISTORY:  No pertinent family history in first degree relatives    no significant family history of immunosuppressive disorders in mother or father   =======================================================    REVIEW OF SYSTEMS:  CONSTITUTIONAL:  No Fever or chills  HEENT:  No diplopia or blurred vision.  No earache, sore throat or runny nose.  CARDIOVASCULAR:  No pressure, squeezing, strangling, tightness, heaviness or aching about the chest, neck, axilla or epigastrium.  RESPIRATORY: + SOB  GASTROINTESTINAL:  No nausea, vomiting or diarrhea.  GENITOURINARY:  No dysuria, frequency or urgency. No Blood in urine  MUSCULOSKELETAL:  no joint aches, no muscle pain  SKIN:  No change in skin, hair or nails.  NEUROLOGIC:  No Headaches, seizures or weakness.  PSYCHIATRIC:  No disorder of thought or mood.  ENDOCRINE:  No heat or cold intolerance  HEMATOLOGICAL:  No easy bruising or bleeding.    =======================================================  Allergies  No Known Allergies       ======================================================  Physical Exam:  ============    General:  No acute distress.  OBESE  Eye: Pupils are equal, round and reactive to light, Extraocular movements are intact, Normal conjunctiva.  HENT: Normocephalic, Oral mucosa is moist, No pharyngeal erythema, No sinus tenderness.  Neck: Supple, No lymphadenopathy.  Respiratory: Lungs with trace crackles; vesicular breath sounds at apices and mid lung zones  Cardiovascular: Normal rate, Regular rhythm,   Gastrointestinal: Soft, Non-tender, Non-distended, Normal bowel sounds.  Genitourinary: No costovertebral angle tenderness.  Lymphatics: No lymphadenopathy neck,   Musculoskeletal: Normal range of motion, Normal strength.  Integumentary: No rash.  Neurologic: Alert, Oriented, No focal deficits, Cranial Nerves II-XII are grossly intact.  Psychiatric: Appropriate mood & affect.    =======================================================  Vitals:  ============  T(F): 98.2 (05 Mar 2021 04:05), Max: 98.3 (04 Mar 2021 16:15)  HR: 90 (05 Mar 2021 07:59)  BP: 166/80 (05 Mar 2021 06:45)  RR: 18 (05 Mar 2021 04:05)  SpO2: 93% (05 Mar 2021 07:59) (90% - 93%)  temp max in last 48H T(F): , Max: 99.3 (03-03-21 @ 16:32)    =======================================================  Current Antibiotics:  remdesivir  IVPB   IV Intermittent   remdesivir  IVPB 100 milliGRAM(s) IV Intermittent every 24 hours    Other medications:  amLODIPine   Tablet 10 milliGRAM(s) Oral daily  dexAMETHasone     Tablet 6 milliGRAM(s) Oral daily  dextrose 40% Gel 15 Gram(s) Oral once  dextrose 5%. 1000 milliLiter(s) IV Continuous <Continuous>  dextrose 5%. 1000 milliLiter(s) IV Continuous <Continuous>  dextrose 50% Injectable 25 Gram(s) IV Push once  dextrose 50% Injectable 12.5 Gram(s) IV Push once  dextrose 50% Injectable 25 Gram(s) IV Push once  enoxaparin Injectable 40 milliGRAM(s) SubCutaneous daily  glucagon  Injectable 1 milliGRAM(s) IntraMuscular once  hydrALAZINE 50 milliGRAM(s) Oral every 8 hours  hydrochlorothiazide 25 milliGRAM(s) Oral daily  insulin glargine Injectable (LANTUS) 10 Unit(s) SubCutaneous at bedtime  insulin lispro (ADMELOG) corrective regimen sliding scale   SubCutaneous three times a day before meals  insulin lispro (ADMELOG) corrective regimen sliding scale   SubCutaneous at bedtime  insulin lispro Injectable (ADMELOG) 4 Unit(s) SubCutaneous three times a day with meals  lisinopril 10 milliGRAM(s) Oral daily      =======================================================  Labs:                        12.9   6.96  )-----------( 515      ( 05 Mar 2021 08:17 )             39.9     03-05    135  |  99  |  25.0<H>  ----------------------------<  207<H>  4.4   |  18.0<L>  |  0.85    Ca    8.5<L>      05 Mar 2021 08:17    TPro  6.9  /  Alb  2.9<L>  /  TBili  0.4  /  DBili  0.2  /  AST  136<H>  /  ALT  97<H>  /  AlkPhos  108  03-05      Culture - Urine (collected 02-28-21 @ 02:38)  Source: .Urine Clean Catch (Midstream)  Final Report (03-02-21 @ 14:29):    10,000 - 49,000 CFU/mL Escherichia coli    Normal Urogenital nicolas present  Organism: Escherichia coli (03-02-21 @ 14:29)  Organism: Escherichia coli (03-02-21 @ 14:29)    Sensitivities:      -  Amikacin: S <=16      -  Amoxicillin/Clavulanic Acid: S <=8/4      -  Ampicillin: S <=8 These ampicillin results predict results for amoxicillin      -  Ampicillin/Sulbactam: S <=4/2 Enterobacter, Citrobacter, and Serratia may develop resistance during prolonged therapy (3-4 days)      -  Aztreonam: S <=4      -  Cefazolin: S <=2 (MIC_CL_COM_ENTERIC_CEFAZU) For uncomplicated UTI with K. pneumoniae, E. coli, or P. mirablis: CHIKI <=16 is sensitive and CHIKI >=32 is resistant. This also predicts results for oral agents cefaclor, cefdinir, cefpodoxime, cefprozil, cefuroxime axetil, cephalexin and locarbef for uncomplicated UTI. Note that some isolates may be susceptible to these agents while testing resistant to cefazolin.      -  Cefepime: S <=2      -  Cefoxitin: S <=8      -  Ceftriaxone: S <=1 Enterobacter, Citrobacter, and Serratia may develop resistance during prolonged therapy      -  Ciprofloxacin: R >2      -  Ertapenem: S <=0.5      -  Gentamicin: S <=2      -  Imipenem: S <=1      -  Levofloxacin: R 4      -  Meropenem: S <=1      -  Nitrofurantoin: S <=32 Should not be used to treat pyelonephritis      -  Piperacillin/Tazobactam: S <=8      -  Tigecycline: S <=2      -  Tobramycin: S <=2      -  Trimethoprim/Sulfamethoxazole: S <=0.5/9.5      Method Type: CHIKI    Culture - Blood (collected 02-27-21 @ 19:16)  Source: .Blood Blood-Peripheral  Final Report (03-04-21 @ 21:00):    No growth at 5 days.    Culture - Blood (collected 02-27-21 @ 19:16)  Source: .Blood Blood-Peripheral  Final Report (03-04-21 @ 21:00):    No growth at 5 days.      Creatinine, Serum: 0.85 mg/dL (03-05-21 @ 08:17)  Creatinine, Serum: 0.85 mg/dL (03-04-21 @ 07:57)  Creatinine, Serum: 0.96 mg/dL (03-03-21 @ 07:07)  Creatinine, Serum: 1.00 mg/dL (03-02-21 @ 07:47)  Creatinine, Serum: 1.00 mg/dL (03-02-21 @ 07:47)  Creatinine, Serum: 0.86 mg/dL (03-01-21 @ 09:44)  Creatinine, Serum: 0.82 mg/dL (02-28-21 @ 14:11)    Procalcitonin, Serum: 0.79 ng/mL (03-02-21 @ 07:47)  Procalcitonin, Serum: 0.36 ng/mL (02-27-21 @ 18:39)    D-Dimer Assay, Quantitative: 1814 ng/mL DDU (03-03-21 @ 07:07)  D-Dimer Assay, Quantitative: 1746 ng/mL DDU (03-02-21 @ 07:48)  D-Dimer Assay, Quantitative: 165 ng/mL DDU (02-27-21 @ 18:39)    Ferritin, Serum: 667 ng/mL (03-03-21 @ 07:07)  Ferritin, Serum: 550 ng/mL (03-02-21 @ 07:47)  Ferritin, Serum: 298 ng/mL (02-27-21 @ 18:39)    C-Reactive Protein, Serum: 10.71 mg/dL (02-27-21 @ 18:39)    WBC Count: 6.96 K/uL (03-05-21 @ 08:17)  WBC Count: 7.67 K/uL (03-04-21 @ 07:57)  WBC Count: 10.56 K/uL (03-03-21 @ 07:07)  WBC Count: 13.02 K/uL (03-02-21 @ 07:49)  WBC Count: 12.08 K/uL (03-01-21 @ 09:44)    Rapid RVP Result: Detected (02-27-21 @ 19:03)  SARS-CoV-2: Detected (02-27-21 @ 19:03)    COVID-19 IgG Antibody Index: 0.10 Index (02-28-21 @ 12:10)  COVID-19 IgG Antibody Interpretation: Negative (02-28-21 @ 12:10)    Lactate Dehydrogenase, Serum: 481 U/L (03-03-21 @ 07:07)  Lactate Dehydrogenase, Serum: 533 U/L (03-02-21 @ 07:47)    Alkaline Phosphatase, Serum: 108 U/L (03-05-21 @ 08:17)  Alkaline Phosphatase, Serum: 108 U/L (03-05-21 @ 08:17)  Alkaline Phosphatase, Serum: 110 U/L (03-04-21 @ 07:57)  Alkaline Phosphatase, Serum: 103 U/L (03-04-21 @ 07:57)  Alkaline Phosphatase, Serum: 98 U/L (03-03-21 @ 07:07)  Alkaline Phosphatase, Serum: 103 U/L (03-03-21 @ 07:07)  Alkaline Phosphatase, Serum: 104 U/L (03-02-21 @ 07:47)  Alkaline Phosphatase, Serum: 104 U/L (03-02-21 @ 07:47)  Alanine Aminotransferase (ALT/SGPT): 97 U/L (03-05-21 @ 08:17)  Alanine Aminotransferase (ALT/SGPT): 97 U/L (03-05-21 @ 08:17)  Alanine Aminotransferase (ALT/SGPT): 49 U/L (03-04-21 @ 07:57)  Alanine Aminotransferase (ALT/SGPT): 50 U/L (03-04-21 @ 07:57)  Alanine Aminotransferase (ALT/SGPT): 57 U/L (03-03-21 @ 07:07)  Alanine Aminotransferase (ALT/SGPT): 57 U/L (03-03-21 @ 07:07)  Alanine Aminotransferase (ALT/SGPT): 79 U/L (03-02-21 @ 07:47)  Alanine Aminotransferase (ALT/SGPT): 79 U/L (03-02-21 @ 07:47)  Aspartate Aminotransferase (AST/SGOT): 136 U/L (03-05-21 @ 08:17)  Aspartate Aminotransferase (AST/SGOT): 137 U/L (03-05-21 @ 08:17)  Aspartate Aminotransferase (AST/SGOT): 51 U/L (03-04-21 @ 07:57)  Aspartate Aminotransferase (AST/SGOT): 48 U/L (03-04-21 @ 07:57)  Aspartate Aminotransferase (AST/SGOT): 49 U/L (03-03-21 @ 07:07)  Aspartate Aminotransferase (AST/SGOT): 49 U/L (03-03-21 @ 07:07)  Aspartate Aminotransferase (AST/SGOT): 94 U/L (03-02-21 @ 07:47)  Aspartate Aminotransferase (AST/SGOT): 94 U/L (03-02-21 @ 07:47)  Bilirubin Total, Serum: 0.4 mg/dL (03-05-21 @ 08:17)  Bilirubin Total, Serum: 0.4 mg/dL (03-05-21 @ 08:17)  Bilirubin Total, Serum: 0.4 mg/dL (03-04-21 @ 07:57)  Bilirubin Total, Serum: 0.4 mg/dL (03-04-21 @ 07:57)  Bilirubin Total, Serum: 0.4 mg/dL (03-03-21 @ 07:07)  Bilirubin Total, Serum: 0.4 mg/dL (03-03-21 @ 07:07)  Bilirubin Total, Serum: 0.7 mg/dL (03-02-21 @ 07:47)  Bilirubin Total, Serum: 0.7 mg/dL (03-02-21 @ 07:47)  Bilirubin Direct, Serum: 0.2 mg/dL (03-05-21 @ 08:17)  Bilirubin Direct, Serum: 0.1 mg/dL (03-04-21 @ 07:57)  Bilirubin Direct, Serum: 0.2 mg/dL (03-03-21 @ 07:07)  Bilirubin Direct, Serum: 0.4 mg/dL (03-02-21 @ 07:47)  
[facility]  INFECTIOUS DISEASES AND INTERNAL MEDICINE at [place]  =======================================================  Darvin Carter MD  Diplomates American Board of Internal Medicine and Infectious Diseases  Tel  [phone]  Fax [phone]  =======================================================    [id number]  BRADY GBEMI  follow up: COVID-19    down to nasal cannula  sats of 89 percent  less tachypneic    Social Hx:  =======  no toxic habits currently    FAMILY HISTORY:  No pertinent family history in first degree relatives    no significant family history of immunosuppressive disorders in mother or father   =======================================================    REVIEW OF SYSTEMS:  CONSTITUTIONAL:  No Fever or chills  HEENT:  No diplopia or blurred vision.  No earache, sore throat or runny nose.  CARDIOVASCULAR:  No pressure, squeezing, strangling, tightness, heaviness or aching about the chest, neck, axilla or epigastrium.  RESPIRATORY: + SOB  GASTROINTESTINAL:  No nausea, vomiting or diarrhea.  GENITOURINARY:  No dysuria, frequency or urgency. No Blood in urine  MUSCULOSKELETAL:  no joint aches, no muscle pain  SKIN:  No change in skin, hair or nails.  NEUROLOGIC:  No Headaches, seizures or weakness.  PSYCHIATRIC:  No disorder of thought or mood.  ENDOCRINE:  No heat or cold intolerance  HEMATOLOGICAL:  No easy bruising or bleeding.    =======================================================  Allergies  No Known Allergies       ======================================================  Physical Exam:  ============    General:  No acute distress.  OBESE  Eye: Pupils are equal, round and reactive to light, Extraocular movements are intact, Normal conjunctiva.  HENT: Normocephalic, Oral mucosa is moist, No pharyngeal erythema, No sinus tenderness.  Neck: Supple, No lymphadenopathy.  Respiratory: Lungs with CRACKLES at bases; improving aeration at apices  Cardiovascular: Normal rate, Regular rhythm,   Gastrointestinal: Soft, Non-tender, Non-distended, Normal bowel sounds.  Genitourinary: No costovertebral angle tenderness.  Lymphatics: No lymphadenopathy neck,   Musculoskeletal: Normal range of motion, Normal strength.  Integumentary: No rash.  Neurologic: Alert, Oriented, No focal deficits, Cranial Nerves II-XII are grossly intact.  Psychiatric: Appropriate mood & affect.    =======================================================  Vitals:  ============  T(F): 97.9 (04 Mar 2021 09:16), Max: 99.3 (03 Mar 2021 16:32)  HR: 85 (04 Mar 2021 09:16)  BP: 163/90 (04 Mar 2021 09:16)  RR: 20 (04 Mar 2021 09:16)  SpO2: 90% (04 Mar 2021 09:16) (88% - 94%)  temp max in last 48H T(F): , Max: 100.1 (03-02-21 @ 12:30)    =======================================================  Current Antibiotics:  remdesivir  IVPB   IV Intermittent   remdesivir  IVPB 100 milliGRAM(s) IV Intermittent every 24 hours    Other medications:  amLODIPine   Tablet 10 milliGRAM(s) Oral daily  dexAMETHasone     Tablet 6 milliGRAM(s) Oral daily  dextrose 40% Gel 15 Gram(s) Oral once  dextrose 5%. 1000 milliLiter(s) IV Continuous <Continuous>  dextrose 5%. 1000 milliLiter(s) IV Continuous <Continuous>  dextrose 50% Injectable 25 Gram(s) IV Push once  dextrose 50% Injectable 12.5 Gram(s) IV Push once  dextrose 50% Injectable 25 Gram(s) IV Push once  enoxaparin Injectable 40 milliGRAM(s) SubCutaneous daily  glucagon  Injectable 1 milliGRAM(s) IntraMuscular once  hydrALAZINE 50 milliGRAM(s) Oral every 8 hours  insulin glargine Injectable (LANTUS) 6 Unit(s) SubCutaneous at bedtime  insulin lispro (ADMELOG) corrective regimen sliding scale   SubCutaneous three times a day before meals  insulin lispro (ADMELOG) corrective regimen sliding scale   SubCutaneous at bedtime  insulin lispro Injectable (ADMELOG) 2 Unit(s) SubCutaneous three times a day with meals  lisinopril 10 milliGRAM(s) Oral daily      =======================================================  Labs:                        12.5   7.67  )-----------( 399      ( 04 Mar 2021 07:57 )             38.8     03-04    132<L>  |  96<L>  |  23.0<H>  ----------------------------<  219<H>  4.6   |  21.0<L>  |  0.85    Ca    8.7      04 Mar 2021 07:57    TPro  7.1  /  Alb  2.9<L>  /  TBili  0.4  /  DBili  0.1  /  AST  51<H>  /  ALT  49<H>  /  AlkPhos  110  03-04      Culture - Urine (collected 02-28-21 @ 02:38)  Source: .Urine Clean Catch (Midstream)  Final Report (03-02-21 @ 14:29):    10,000 - 49,000 CFU/mL Escherichia coli    Normal Urogenital nicolas present  Organism: Escherichia coli (03-02-21 @ 14:29)  Organism: Escherichia coli (03-02-21 @ 14:29)    Sensitivities:      -  Amikacin: S <=16      -  Amoxicillin/Clavulanic Acid: S <=8/4      -  Ampicillin: S <=8 These ampicillin results predict results for amoxicillin      -  Ampicillin/Sulbactam: S <=4/2 Enterobacter, Citrobacter, and Serratia may develop resistance during prolonged therapy (3-4 days)      -  Aztreonam: S <=4      -  Cefazolin: S <=2 (MIC_CL_COM_ENTERIC_CEFAZU) For uncomplicated UTI with K. pneumoniae, E. coli, or P. mirablis: CHIKI <=16 is sensitive and CHIKI >=32 is resistant. This also predicts results for oral agents cefaclor, cefdinir, cefpodoxime, cefprozil, cefuroxime axetil, cephalexin and locarbef for uncomplicated UTI. Note that some isolates may be susceptible to these agents while testing resistant to cefazolin.      -  Cefepime: S <=2      -  Cefoxitin: S <=8      -  Ceftriaxone: S <=1 Enterobacter, Citrobacter, and Serratia may develop resistance during prolonged therapy      -  Ciprofloxacin: R >2      -  Ertapenem: S <=0.5      -  Gentamicin: S <=2      -  Imipenem: S <=1      -  Levofloxacin: R 4      -  Meropenem: S <=1      -  Nitrofurantoin: S <=32 Should not be used to treat pyelonephritis      -  Piperacillin/Tazobactam: S <=8      -  Tigecycline: S <=2      -  Tobramycin: S <=2      -  Trimethoprim/Sulfamethoxazole: S <=0.5/9.5      Method Type: CHIKI    Culture - Blood (collected 02-27-21 @ 19:16)  Source: .Blood Blood-Peripheral    Culture - Blood (collected 02-27-21 @ 19:16)  Source: .Blood Blood-Peripheral        C-Reactive Protein, Serum: 10.71 mg/dL (02-27-21 @ 18:39)      Procalcitonin, Serum: 0.79 ng/mL (03-02-21 @ 07:47)  Procalcitonin, Serum: 0.36 ng/mL (02-27-21 @ 18:39)    Rapid RVP Result: Detected (02-27-21 @ 19:03)  SARS-CoV-2: Detected (02-27-21 @ 19:03)    COVID-19 IgG Antibody Index: 0.10 Index (02-28-21 @ 12:10)  COVID-19 IgG Antibody Interpretation: Negative (02-28-21 @ 12:10)    
[facility] Division of Hospital Medicine    Chief Complaint:  Patient is a 65y old  Female who presents with a chief complaint of covid19, hypoxia (2021 23:07)      SUBJECTIVE / OVERNIGHT EVENTS:  Patient was seen and examined at bedside. States to feel fatigued but no other complaints.   Patient denies chest pain, SOB, abd pain, N/V, fever, chills, dysuria or any other complaints. All remainder ROS negative.     MEDICATIONS  (STANDING):  amLODIPine   Tablet 10 milliGRAM(s) Oral daily  captopril 25 milliGRAM(s) Oral every 8 hours  dextrose 40% Gel 15 Gram(s) Oral once  dextrose 5%. 1000 milliLiter(s) (50 mL/Hr) IV Continuous <Continuous>  dextrose 5%. 1000 milliLiter(s) (100 mL/Hr) IV Continuous <Continuous>  dextrose 50% Injectable 25 Gram(s) IV Push once  dextrose 50% Injectable 12.5 Gram(s) IV Push once  dextrose 50% Injectable 25 Gram(s) IV Push once  enoxaparin Injectable 40 milliGRAM(s) SubCutaneous daily  glucagon  Injectable 1 milliGRAM(s) IntraMuscular once  insulin lispro (ADMELOG) corrective regimen sliding scale   SubCutaneous three times a day before meals  insulin lispro (ADMELOG) corrective regimen sliding scale   SubCutaneous at bedtime    MEDICATIONS  (PRN):  acetaminophen   Tablet .. 650 milliGRAM(s) Oral every 6 hours PRN Temp greater or equal to 38C (100.4F), Mild Pain (1 - 3), Moderate Pain (4 - 6)        I&O's Summary      PHYSICAL EXAM:  Vital Signs Last 24 Hrs  T(C): 36.9 (2021 12:00), Max: 39.4 (2021 00:54)  T(F): 98.4 (2021 12:00), Max: 103 (2021 00:54)  HR: 88 (2021 12:00) (82 - 106)  BP: 158/84 (2021 12:00) (142/74 - 189/101)  BP(mean): --  RR: 18 (2021 12:00) (18 - 20)  SpO2: 90% (2021 12:00) (90% - 98%)      Constitutional: NAD, well-developed, well-groomed  ENT: MMM, no thrush, Supple, No JVD  Lungs: Clear to auscultation bilaterally, good air entry, non-labored breathing  Cardio: RRR, S1/S2, No murmur  Abdomen: Soft, Nontender, Nondistended; Bowel sounds present  Extremities: No calf tenderness, No pitting edema  Musculoskeletal:   No clubbing or cyanosis of digits; no joint swelling or tenderness to palpation  Psych: A+O to person, place, and time; affect appropriate  Neuro: CN 2-12 are intact and symmetric; no gross sensory deficits;   Skin: No rashes; no palpable lesions    LABS:                        12.2   6.18  )-----------( 214      ( 2021 08:20 )             38.1         138  |  101  |  12.0  ----------------------------<  254<H>  4.0   |  18.0<L>  |  1.05    Ca    8.3<L>      2021 08:20  Phos  3.6       Mg     1.6         TPro  7.1  /  Alb  3.3  /  TBili  0.2<L>  /  DBili  x   /  AST  41<H>  /  ALT  49<H>  /  AlkPhos  58        CARDIAC MARKERS ( 2021 18:39 )  x     / <0.01 ng/mL / x     / x     / x          Urinalysis Basic - ( 2021 20:30 )    Color: Yellow / Appearance: Clear / S.015 / pH: x  Gluc: x / Ketone: Moderate  / Bili: Negative / Urobili: Negative mg/dL   Blood: x / Protein: 500 mg/dL / Nitrite: Negative   Leuk Esterase: Small / RBC: 3-5 /HPF / WBC 6-10   Sq Epi: x / Non Sq Epi: Moderate / Bacteria: Few        CAPILLARY BLOOD GLUCOSE      POCT Blood Glucose.: 353 mg/dL (2021 12:07)        RADIOLOGY REVIEWED  
[facility] Division of Hospital Medicine    Chief Complaint:  Patient is a 65y old  Female who presents with a chief complaint of covid19, hypoxia (2021 23:07)    Patient was seen and examined at bedside  Patient complains of some chest pain when coughing or taking a deep breath. Otherwise denies pressure like or radiating chest pain.   Denies other medical complaints  Patient denies SOB, abd pain, N/V, fever, chills, dysuria or any other complaints  All remainder ROS negative  VSS - low grade temp early this am 99.7    Vital Signs Last 24 Hrs  T(C): 37.6 (01 Mar 2021 04:44), Max: 37.6 (01 Mar 2021 04:44)  T(F): 99.7 (01 Mar 2021 04:44), Max: 99.7 (01 Mar 2021 04:44)  HR: 106 (01 Mar 2021 04:44) (82 - 106)  BP: 150/90 (01 Mar 2021 05:07) (150/90 - 175/90) *meds being adjusted/titrated   BP(mean): --  RR: 18 (01 Mar 2021 04:44) (18 - 18)  SpO2: 96% (01 Mar 2021 04:44) (90% - 96%) on RA    PHYSICAL EXAM:  Constitutional: NAD   ENT: MMM, no thrush, Supple, No JVD  Lungs: Clear to auscultation bilaterally, good air entry, non-labored breathing  Cardio: RRR, S1/S2, No murmur  Abdomen: Soft, Nontender, Nondistended; Bowel sounds present  Extremities: No calf tenderness, No pitting edema  Musculoskeletal:   No clubbing or cyanosis of digits; no joint swelling or tenderness to palpation  Psych: A+O to person, place, and time; affect appropriate  Neuro: CN 2-12 are intact and symmetric; no gross sensory deficits;       LABS:                          12.7   12.08 )-----------( 281      ( 01 Mar 2021 09:44 )             38.8   02-28    134<L>  |  98  |  16.0  ----------------------------<  327<H>  4.3   |  17.0<L>  |  0.82    Ca    8.4<L>      2021 14:11  Phos  3.6       Mg     1.6         TPro  7.1  /  Alb  3.3  /  TBili  0.2<L>  /  DBili  x   /  AST  41<H>  /  ALT  49<H>  /  AlkPhos  58        Urinalysis Basic - ( 2021 20:30 )  Color: Yellow / Appearance: Clear / S.015 / pH: x  Gluc: x / Ketone: Moderate  / Bili: Negative / Urobili: Negative mg/dL   Blood: x / Protein: 500 mg/dL / Nitrite: Negative   Leuk Esterase: Small / RBC: 3-5 /HPF / WBC 6-10   Sq Epi: x / Non Sq Epi: Moderate / Bacteria: Few        
[facility]  INFECTIOUS DISEASES AND INTERNAL MEDICINE at [place]  =======================================================  Darvin Carter MD  Diplomates American Board of Internal Medicine and Infectious Diseases  Tel  [phone]  Fax [phone]  =======================================================    [id number]  BRADY GBEMI  follow up: COVID-19    remains on nasal cannula  3 Liter      Social Hx:  =======  no toxic habits currently    FAMILY HISTORY:  No pertinent family history in first degree relatives    no significant family history of immunosuppressive disorders in mother or father   =======================================================    REVIEW OF SYSTEMS:  CONSTITUTIONAL:  No Fever or chills  HEENT:  No diplopia or blurred vision.  No earache, sore throat or runny nose.  CARDIOVASCULAR:  No pressure, squeezing, strangling, tightness, heaviness or aching about the chest, neck, axilla or epigastrium.  RESPIRATORY: + mild SOB  GASTROINTESTINAL:  No nausea, vomiting or diarrhea.  GENITOURINARY:  No dysuria, frequency or urgency. No Blood in urine  MUSCULOSKELETAL:  no joint aches, no muscle pain  SKIN:  No change in skin, hair or nails.  NEUROLOGIC:  No Headaches, seizures or weakness.  PSYCHIATRIC:  No disorder of thought or mood.  ENDOCRINE:  No heat or cold intolerance  HEMATOLOGICAL:  No easy bruising or bleeding.    =======================================================  Allergies  No Known Allergies      ======================================================  Physical Exam:  ============    General:  No acute distress.  OBESE  Eye: Pupils are equal, round and reactive to light, Extraocular movements are intact, Normal conjunctiva.  HENT: Normocephalic, Oral mucosa is moist, No pharyngeal erythema, No sinus tenderness.  Neck: Supple, No lymphadenopathy.  Respiratory: Lungs with trace crackles; vesicular breath sounds at apices and mid lung zones  Cardiovascular: Normal rate, Regular rhythm,   Gastrointestinal: Soft, Non-tender, Non-distended, Normal bowel sounds.  Genitourinary: No costovertebral angle tenderness.  Lymphatics: No lymphadenopathy neck,   Musculoskeletal: Normal range of motion, Normal strength.  Integumentary: No rash.  Neurologic: Alert, Oriented, No focal deficits, Cranial Nerves II-XII are grossly intact.  Psychiatric: Appropriate mood & affect.    =======================================================    Vitals:  ============  T(F): 97.5 (09 Mar 2021 08:59), Max: 98.6 (09 Mar 2021 04:59)  HR: 99 (09 Mar 2021 14:38)  BP: 149/84 (09 Mar 2021 14:38)  RR: 18 (09 Mar 2021 04:59)  SpO2: 92% (09 Mar 2021 08:59) (92% - 93%)  temp max in last 48H T(F): , Max: 102.4 (03-08-21 @ 06:48)    =======================================================  Current Antibiotics:  remdesivir  IVPB 100 milliGRAM(s) IV Intermittent every 24 hours    Other medications:  amLODIPine   Tablet 10 milliGRAM(s) Oral daily  dexAMETHasone     Tablet 6 milliGRAM(s) Oral daily  dextrose 40% Gel 15 Gram(s) Oral once  dextrose 5%. 1000 milliLiter(s) IV Continuous <Continuous>  dextrose 5%. 1000 milliLiter(s) IV Continuous <Continuous>  dextrose 50% Injectable 25 Gram(s) IV Push once  dextrose 50% Injectable 12.5 Gram(s) IV Push once  dextrose 50% Injectable 25 Gram(s) IV Push once  enoxaparin Injectable 80 milliGRAM(s) SubCutaneous two times a day  glucagon  Injectable 1 milliGRAM(s) IntraMuscular once  hydrALAZINE 100 milliGRAM(s) Oral every 8 hours  hydrochlorothiazide 25 milliGRAM(s) Oral daily  insulin glargine Injectable (LANTUS) 16 Unit(s) SubCutaneous at bedtime  insulin lispro (ADMELOG) corrective regimen sliding scale   SubCutaneous three times a day before meals  insulin lispro (ADMELOG) corrective regimen sliding scale   SubCutaneous at bedtime  insulin lispro Injectable (ADMELOG) 4 Unit(s) SubCutaneous three times a day with meals  lisinopril 10 milliGRAM(s) Oral daily  metoprolol tartrate 25 milliGRAM(s) Oral two times a day      =======================================================  Labs:                        12.9   8.32  )-----------( 658      ( 09 Mar 2021 07:16 )             39.1     03-09    126<L>  |  91<L>  |  25.0<H>  ----------------------------<  97  3.6   |  18.0<L>  |  0.89    Ca    8.1<L>      09 Mar 2021 07:16    TPro  5.9<L>  /  Alb  2.9<L>  /  TBili  0.4  /  DBili  0.1  /  AST  35<H>  /  ALT  58<H>  /  AlkPhos  82  03-09      Culture - Urine (collected 02-28-21 @ 02:38)  Source: .Urine Clean Catch (Midstream)  Final Report (03-02-21 @ 14:29):    10,000 - 49,000 CFU/mL Escherichia coli    Normal Urogenital nicolas present  Organism: Escherichia coli (03-02-21 @ 14:29)  Organism: Escherichia coli (03-02-21 @ 14:29)    Sensitivities:      -  Amikacin: S <=16      -  Amoxicillin/Clavulanic Acid: S <=8/4      -  Ampicillin: S <=8 These ampicillin results predict results for amoxicillin      -  Ampicillin/Sulbactam: S <=4/2 Enterobacter, Citrobacter, and Serratia may develop resistance during prolonged therapy (3-4 days)      -  Aztreonam: S <=4      -  Cefazolin: S <=2 (MIC_CL_COM_ENTERIC_CEFAZU) For uncomplicated UTI with K. pneumoniae, E. coli, or P. mirablis: CHIKI <=16 is sensitive and CHIKI >=32 is resistant. This also predicts results for oral agents cefaclor, cefdinir, cefpodoxime, cefprozil, cefuroxime axetil, cephalexin and locarbef for uncomplicated UTI. Note that some isolates may be susceptible to these agents while testing resistant to cefazolin.      -  Cefepime: S <=2      -  Cefoxitin: S <=8      -  Ceftriaxone: S <=1 Enterobacter, Citrobacter, and Serratia may develop resistance during prolonged therapy      -  Ciprofloxacin: R >2      -  Ertapenem: S <=0.5      -  Gentamicin: S <=2      -  Imipenem: S <=1      -  Levofloxacin: R 4      -  Meropenem: S <=1      -  Nitrofurantoin: S <=32 Should not be used to treat pyelonephritis      -  Piperacillin/Tazobactam: S <=8      -  Tigecycline: S <=2      -  Tobramycin: S <=2      -  Trimethoprim/Sulfamethoxazole: S <=0.5/9.5      Method Type: CHIKI    Culture - Blood (collected 02-27-21 @ 19:16)  Source: .Blood Blood-Peripheral  Final Report (03-04-21 @ 21:00):    No growth at 5 days.    Culture - Blood (collected 02-27-21 @ 19:16)  Source: .Blood Blood-Peripheral  Final Report (03-04-21 @ 21:00):    No growth at 5 days.      Creatinine, Serum: 0.89 mg/dL (03-09-21 @ 07:16)  Creatinine, Serum: 0.91 mg/dL (03-08-21 @ 07:09)  Creatinine, Serum: 0.92 mg/dL (03-07-21 @ 05:18)  Creatinine, Serum: 0.98 mg/dL (03-06-21 @ 09:05)  Creatinine, Serum: 0.85 mg/dL (03-05-21 @ 08:17)    Procalcitonin, Serum: 0.79 ng/mL (03-02-21 @ 07:47)  Procalcitonin, Serum: 0.36 ng/mL (02-27-21 @ 18:39)    D-Dimer Assay, Quantitative: 2586 ng/mL DDU (03-06-21 @ 18:50)  D-Dimer Assay, Quantitative: 1814 ng/mL DDU (03-03-21 @ 07:07)  D-Dimer Assay, Quantitative: 1746 ng/mL DDU (03-02-21 @ 07:48)  D-Dimer Assay, Quantitative: 165 ng/mL DDU (02-27-21 @ 18:39)    Ferritin, Serum: 667 ng/mL (03-03-21 @ 07:07)  Ferritin, Serum: 550 ng/mL (03-02-21 @ 07:47)  Ferritin, Serum: 298 ng/mL (02-27-21 @ 18:39)    C-Reactive Protein, Serum: 10.71 mg/dL (02-27-21 @ 18:39)    WBC Count: 8.32 K/uL (03-09-21 @ 07:16)  WBC Count: 8.18 K/uL (03-08-21 @ 07:09)  WBC Count: 8.26 K/uL (03-07-21 @ 05:18)  WBC Count: 8.29 K/uL (03-06-21 @ 09:05)  WBC Count: 6.96 K/uL (03-05-21 @ 08:17)    Rapid RVP Result: Detected (02-27-21 @ 19:03)  SARS-CoV-2: Detected (02-27-21 @ 19:03)    COVID-19 IgG Antibody Index: 0.10 Index (02-28-21 @ 12:10)  COVID-19 IgG Antibody Interpretation: Negative (02-28-21 @ 12:10)    Lactate Dehydrogenase, Serum: 481 U/L (03-03-21 @ 07:07)  Lactate Dehydrogenase, Serum: 533 U/L (03-02-21 @ 07:47)    Alkaline Phosphatase, Serum: 82 U/L (03-09-21 @ 07:17)  Alkaline Phosphatase, Serum: 99 U/L (03-08-21 @ 07:09)  Alkaline Phosphatase, Serum: 108 U/L (03-07-21 @ 05:18)  Alkaline Phosphatase, Serum: 104 U/L (03-07-21 @ 05:17)  Alkaline Phosphatase, Serum: 116 U/L (03-06-21 @ 09:05)  Alkaline Phosphatase, Serum: 115 U/L (03-06-21 @ 09:05)  Alanine Aminotransferase (ALT/SGPT): 58 U/L (03-09-21 @ 07:17)  Alanine Aminotransferase (ALT/SGPT): 71 U/L (03-08-21 @ 07:09)  Alanine Aminotransferase (ALT/SGPT): 93 U/L (03-07-21 @ 05:18)  Alanine Aminotransferase (ALT/SGPT): 94 U/L (03-07-21 @ 05:17)  Alanine Aminotransferase (ALT/SGPT): 111 U/L (03-06-21 @ 09:05)  Alanine Aminotransferase (ALT/SGPT): 108 U/L (03-06-21 @ 09:05)  Aspartate Aminotransferase (AST/SGOT): 35 U/L (03-09-21 @ 07:17)  Aspartate Aminotransferase (AST/SGOT): 35 U/L (03-08-21 @ 07:09)  Aspartate Aminotransferase (AST/SGOT): 62 U/L (03-07-21 @ 05:18)  Aspartate Aminotransferase (AST/SGOT): 61 U/L (03-07-21 @ 05:17)  Aspartate Aminotransferase (AST/SGOT): 122 U/L (03-06-21 @ 09:05)  Aspartate Aminotransferase (AST/SGOT): 121 U/L (03-06-21 @ 09:05)  Bilirubin Total, Serum: 0.4 mg/dL (03-09-21 @ 07:17)  Bilirubin Total, Serum: 0.4 mg/dL (03-08-21 @ 07:09)  Bilirubin Total, Serum: 0.5 mg/dL (03-07-21 @ 05:18)  Bilirubin Total, Serum: 0.4 mg/dL (03-07-21 @ 05:17)  Bilirubin Total, Serum: 0.5 mg/dL (03-06-21 @ 09:05)  Bilirubin Total, Serum: 0.5 mg/dL (03-06-21 @ 09:05)  Bilirubin Direct, Serum: 0.1 mg/dL (03-09-21 @ 07:17)  Bilirubin Direct, Serum: 0.1 mg/dL (03-08-21 @ 07:09)  Bilirubin Direct, Serum: 0.2 mg/dL (03-07-21 @ 05:17)  Bilirubin Direct, Serum: 0.2 mg/dL (03-06-21 @ 09:05)    
CC:   INTERVAL HPI/OVERNIGHT EVENTS: Pt seen and examined at bedside this AM by PA. Pt in NAD, on NRB. States she is feeling better. Admits to productive cough. Denies N/V/D/C, HA, CP, SOB, abd pain, dysuria, hematuria, leg swelling/pain. Pt ambulates independently. Last bowel movement was yesterday. Denies dark/bloody stool.       Allergies  No Known Allergies  Intolerances    HEALTH ISSUES - PROBLEM Dx:  PAST MEDICAL & SURGICAL HISTORY:  Prediabetes  Hypertension  No significant past surgical history  MEDICATIONS  (STANDING):  amLODIPine   Tablet 10 milliGRAM(s) Oral daily  dexAMETHasone     Tablet 6 milliGRAM(s) Oral daily  dextrose 40% Gel 15 Gram(s) Oral once  dextrose 5%. 1000 milliLiter(s) (50 mL/Hr) IV Continuous <Continuous>  dextrose 5%. 1000 milliLiter(s) (100 mL/Hr) IV Continuous <Continuous>  dextrose 50% Injectable 25 Gram(s) IV Push once  dextrose 50% Injectable 12.5 Gram(s) IV Push once  dextrose 50% Injectable 25 Gram(s) IV Push once  enoxaparin Injectable 40 milliGRAM(s) SubCutaneous daily  glucagon  Injectable 1 milliGRAM(s) IntraMuscular once  hydrALAZINE 25 milliGRAM(s) Oral every 8 hours  insulin lispro (ADMELOG) corrective regimen sliding scale   SubCutaneous three times a day before meals  insulin lispro (ADMELOG) corrective regimen sliding scale   SubCutaneous at bedtime  lisinopril 10 milliGRAM(s) Oral daily  remdesivir  IVPB   IV Intermittent   remdesivir  IVPB 100 milliGRAM(s) IV Intermittent every 24 hours    MEDICATIONS  (PRN):  acetaminophen   Tablet .. 650 milliGRAM(s) Oral every 6 hours PRN Temp greater or equal to 38C (100.4F), Mild Pain (1 - 3), Moderate Pain (4 - 6)  hydrALAZINE Injectable 10 milliGRAM(s) IV Push every 6 hours PRN SBP > 150      Vital Signs Last 24 Hrs  T(C): 37.2 (03 Mar 2021 09:22), Max: 37.8 (02 Mar 2021 12:30)  T(F): 99 (03 Mar 2021 09:22), Max: 100.1 (02 Mar 2021 12:30)  HR: 92 (03 Mar 2021 09:22) (74 - 96)  BP: 159/80 (03 Mar 2021 09:22) (147/93 - 175/96)  BP(mean): 95 (02 Mar 2021 20:43) (95 - 112)  RR: 20 (03 Mar 2021 10:04) (20 - 20)  SpO2: 92% (03 Mar 2021 10:04) (85% - 95%))    PHYSICAL EXAM:  GENERAL: Pt lying in bed comfortably in NAD with NRB  HEAD:  Atraumatic  EYES: conjunctiva and sclera clear  ENT: Moist mucous membranes  NECK: Supple, No JVD  CHEST/LUNG: Clear to auscultation bilaterally, Non-labored respirations  HEART: Regular rate and rhythm, S1S2  ABDOMEN: Bowel sounds present; Soft, Nontender, Nondistended. No guarding or rigidity   EXTREMITIES: No LE edema or tenderness  NERVOUS SYSTEM:  Alert & Oriented X3, speech clear, answers questions appropriately.  No deficits   SKIN: Warm and dry     LABS:                        11.7   10.56 )-----------( 329      ( 03 Mar 2021 07:07 )             36.2     03-03    132<L>  |  98  |  18.0  ----------------------------<  178<H>  4.2   |  21.0<L>  |  0.96    Ca    8.5<L>      03 Mar 2021 07:07    TPro  7.0  /  Alb  3.0<L>  /  TBili  0.4  /  DBili  0.2  /  AST  49<H>  /  ALT  57<H>  /  AlkPhos  98  03-03    PT/INR - ( 03 Mar 2021 07:07 )   PT: 12.9 sec;   INR: 1.12 ratio      ABG - ( 02 Mar 2021 09:03 )  pH, Arterial: 7.51  pH, Blood: x     /  pCO2: 30    /  pO2: 69    / HCO3: 26    / Base Excess: 1.8   /  SaO2: 95    D-Dimer Assay, Quantitative (03.03.21 @ 07:07)   D-Dimer Assay, Quantitative: 1814 ng/mL DDU   < from: CT Angio Chest w/ IV Cont (03.02.21 @ 13:28) >  IMPRESSION:  *  Limited study for pulmonary embolism. No pulmonary embolism to the proximal segmental branches. Limited visualization of distal segmental and subsegmental branches secondary to a combination of respiratory motion and suboptimal bolus timing.  *  Moderate bilateral groundglass and consolidative opacities compatible with COVID-19 pneumonia.                    
[facility]  INFECTIOUS DISEASES AND INTERNAL MEDICINE at [place]  =======================================================  Darvin Carter MD  Diplomates American Board of Internal Medicine and Infectious Diseases  Tel  [phone]  Fax [phone]  =======================================================    [id number]  BRADY GBEMI  follow up: COVID-19    remains on nasal cannula  up to chair today        Social Hx:  =======  no toxic habits currently    FAMILY HISTORY:  No pertinent family history in first degree relatives    no significant family history of immunosuppressive disorders in mother or father   =======================================================    REVIEW OF SYSTEMS:  CONSTITUTIONAL:  No Fever or chills  HEENT:  No diplopia or blurred vision.  No earache, sore throat or runny nose.  CARDIOVASCULAR:  No pressure, squeezing, strangling, tightness, heaviness or aching about the chest, neck, axilla or epigastrium.  RESPIRATORY: + mild SOB  GASTROINTESTINAL:  No nausea, vomiting or diarrhea.  GENITOURINARY:  No dysuria, frequency or urgency. No Blood in urine  MUSCULOSKELETAL:  no joint aches, no muscle pain  SKIN:  No change in skin, hair or nails.  NEUROLOGIC:  No Headaches, seizures or weakness.  PSYCHIATRIC:  No disorder of thought or mood.  ENDOCRINE:  No heat or cold intolerance  HEMATOLOGICAL:  No easy bruising or bleeding.    =======================================================  Allergies  No Known Allergies      ======================================================  Physical Exam:  ============    General:  No acute distress.  OBESE  Eye: Pupils are equal, round and reactive to light, Extraocular movements are intact, Normal conjunctiva.  HENT: Normocephalic, Oral mucosa is moist, No pharyngeal erythema, No sinus tenderness.  Neck: Supple, No lymphadenopathy.  Respiratory: Lungs with trace crackles; vesicular breath sounds at apices and mid lung zones  Cardiovascular: Normal rate, Regular rhythm,   Gastrointestinal: Soft, Non-tender, Non-distended, Normal bowel sounds.  Genitourinary: No costovertebral angle tenderness.  Lymphatics: No lymphadenopathy neck,   Musculoskeletal: Normal range of motion, Normal strength.  Integumentary: No rash.  Neurologic: Alert, Oriented, No focal deficits, Cranial Nerves II-XII are grossly intact.  Psychiatric: Appropriate mood & affect.    =======================================================  Vitals:  ============  T(F): 98 (08 Mar 2021 10:52), Max: 102.4 (08 Mar 2021 06:48)  HR: 83 (08 Mar 2021 10:52)  BP: 121/74 (08 Mar 2021 10:52)  RR: 18 (08 Mar 2021 10:52)  SpO2: 94% (08 Mar 2021 10:52) (92% - 94%)  temp max in last 48H T(F): , Max: 102.4 (03-08-21 @ 06:48)    =======================================================  Current Antibiotics:  remdesivir  IVPB 100 milliGRAM(s) IV Intermittent every 24 hours    Other medications:  amLODIPine   Tablet 10 milliGRAM(s) Oral daily  dexAMETHasone     Tablet 6 milliGRAM(s) Oral daily  dextrose 40% Gel 15 Gram(s) Oral once  dextrose 5%. 1000 milliLiter(s) IV Continuous <Continuous>  dextrose 5%. 1000 milliLiter(s) IV Continuous <Continuous>  dextrose 50% Injectable 25 Gram(s) IV Push once  dextrose 50% Injectable 12.5 Gram(s) IV Push once  dextrose 50% Injectable 25 Gram(s) IV Push once  enoxaparin Injectable 80 milliGRAM(s) SubCutaneous two times a day  glucagon  Injectable 1 milliGRAM(s) IntraMuscular once  hydrALAZINE 100 milliGRAM(s) Oral every 8 hours  hydrochlorothiazide 25 milliGRAM(s) Oral daily  insulin glargine Injectable (LANTUS) 16 Unit(s) SubCutaneous at bedtime  insulin lispro (ADMELOG) corrective regimen sliding scale   SubCutaneous three times a day before meals  insulin lispro (ADMELOG) corrective regimen sliding scale   SubCutaneous at bedtime  insulin lispro Injectable (ADMELOG) 4 Unit(s) SubCutaneous three times a day with meals  lisinopril 10 milliGRAM(s) Oral daily  metoprolol tartrate 25 milliGRAM(s) Oral two times a day      =======================================================  Labs:                        13.6   8.18  )-----------( 686      ( 08 Mar 2021 07:09 )             42.4     03-08    x   |  x   |  x   ----------------------------<  x   x    |  x   |  0.91    Ca    8.5<L>      07 Mar 2021 05:18  Phos  4.2     03-07  Mg     2.1     03-07    TPro  6.5<L>  /  Alb  3.1<L>  /  TBili  0.4  /  DBili  0.1  /  AST  35<H>  /  ALT  71<H>  /  AlkPhos  99  03-08         Culture - Blood (collected 02-27-21 @ 19:16)  Source: .Blood Blood-Peripheral  Final Report (03-04-21 @ 21:00):    No growth at 5 days.    Culture - Blood (collected 02-27-21 @ 19:16)  Source: .Blood Blood-Peripheral  Final Report (03-04-21 @ 21:00):    No growth at 5 days.      Creatinine, Serum: 0.91 mg/dL (03-08-21 @ 07:09)  Creatinine, Serum: 0.92 mg/dL (03-07-21 @ 05:18)  Creatinine, Serum: 0.98 mg/dL (03-06-21 @ 09:05)  Creatinine, Serum: 0.85 mg/dL (03-05-21 @ 08:17)  Creatinine, Serum: 0.85 mg/dL (03-04-21 @ 07:57)    Procalcitonin, Serum: 0.79 ng/mL (03-02-21 @ 07:47)  Procalcitonin, Serum: 0.36 ng/mL (02-27-21 @ 18:39)    D-Dimer Assay, Quantitative: 2586 ng/mL DDU (03-06-21 @ 18:50)  D-Dimer Assay, Quantitative: 1814 ng/mL DDU (03-03-21 @ 07:07)  D-Dimer Assay, Quantitative: 1746 ng/mL DDU (03-02-21 @ 07:48)  D-Dimer Assay, Quantitative: 165 ng/mL DDU (02-27-21 @ 18:39)    Ferritin, Serum: 667 ng/mL (03-03-21 @ 07:07)  Ferritin, Serum: 550 ng/mL (03-02-21 @ 07:47)  Ferritin, Serum: 298 ng/mL (02-27-21 @ 18:39)    C-Reactive Protein, Serum: 10.71 mg/dL (02-27-21 @ 18:39)    WBC Count: 8.18 K/uL (03-08-21 @ 07:09)  WBC Count: 8.26 K/uL (03-07-21 @ 05:18)  WBC Count: 8.29 K/uL (03-06-21 @ 09:05)  WBC Count: 6.96 K/uL (03-05-21 @ 08:17)  WBC Count: 7.67 K/uL (03-04-21 @ 07:57)    Rapid RVP Result: Detected (02-27-21 @ 19:03)  SARS-CoV-2: Detected (02-27-21 @ 19:03)    COVID-19 IgG Antibody Index: 0.10 Index (02-28-21 @ 12:10)  COVID-19 IgG Antibody Interpretation: Negative (02-28-21 @ 12:10)    Lactate Dehydrogenase, Serum: 481 U/L (03-03-21 @ 07:07)  Lactate Dehydrogenase, Serum: 533 U/L (03-02-21 @ 07:47)    Alkaline Phosphatase, Serum: 99 U/L (03-08-21 @ 07:09)  Alkaline Phosphatase, Serum: 108 U/L (03-07-21 @ 05:18)  Alkaline Phosphatase, Serum: 104 U/L (03-07-21 @ 05:17)  Alkaline Phosphatase, Serum: 116 U/L (03-06-21 @ 09:05)  Alkaline Phosphatase, Serum: 115 U/L (03-06-21 @ 09:05)  Alkaline Phosphatase, Serum: 108 U/L (03-05-21 @ 08:17)  Alkaline Phosphatase, Serum: 108 U/L (03-05-21 @ 08:17)  Alanine Aminotransferase (ALT/SGPT): 71 U/L (03-08-21 @ 07:09)  Alanine Aminotransferase (ALT/SGPT): 93 U/L (03-07-21 @ 05:18)  Alanine Aminotransferase (ALT/SGPT): 94 U/L (03-07-21 @ 05:17)  Alanine Aminotransferase (ALT/SGPT): 111 U/L (03-06-21 @ 09:05)  Alanine Aminotransferase (ALT/SGPT): 108 U/L (03-06-21 @ 09:05)  Alanine Aminotransferase (ALT/SGPT): 97 U/L (03-05-21 @ 08:17)  Alanine Aminotransferase (ALT/SGPT): 97 U/L (03-05-21 @ 08:17)  Aspartate Aminotransferase (AST/SGOT): 35 U/L (03-08-21 @ 07:09)  Aspartate Aminotransferase (AST/SGOT): 62 U/L (03-07-21 @ 05:18)  Aspartate Aminotransferase (AST/SGOT): 61 U/L (03-07-21 @ 05:17)  Aspartate Aminotransferase (AST/SGOT): 122 U/L (03-06-21 @ 09:05)  Aspartate Aminotransferase (AST/SGOT): 121 U/L (03-06-21 @ 09:05)  Aspartate Aminotransferase (AST/SGOT): 136 U/L (03-05-21 @ 08:17)  Aspartate Aminotransferase (AST/SGOT): 137 U/L (03-05-21 @ 08:17)  Bilirubin Total, Serum: 0.4 mg/dL (03-08-21 @ 07:09)  Bilirubin Total, Serum: 0.5 mg/dL (03-07-21 @ 05:18)  Bilirubin Total, Serum: 0.4 mg/dL (03-07-21 @ 05:17)  Bilirubin Total, Serum: 0.5 mg/dL (03-06-21 @ 09:05)  Bilirubin Total, Serum: 0.5 mg/dL (03-06-21 @ 09:05)  Bilirubin Total, Serum: 0.4 mg/dL (03-05-21 @ 08:17)  Bilirubin Total, Serum: 0.4 mg/dL (03-05-21 @ 08:17)  Bilirubin Direct, Serum: 0.1 mg/dL (03-08-21 @ 07:09)  Bilirubin Direct, Serum: 0.2 mg/dL (03-07-21 @ 05:17)  Bilirubin Direct, Serum: 0.2 mg/dL (03-06-21 @ 09:05)  Bilirubin Direct, Serum: 0.2 mg/dL (03-05-21 @ 08:17)    
HPI  Pt is a 66yo F admitted to [facility] for hypoxia secondary to covid  Pt was seen and examined at bedside. Pt is tolerating well on 3L NC, and states she is feeling better     Vital Signs Last 24 Hrs  T(C): 36.7 (10 Mar 2021 10:41), Max: 37.4 (09 Mar 2021 20:59)  T(F): 98 (10 Mar 2021 10:41), Max: 99.4 (09 Mar 2021 20:59)  HR: 80 (10 Mar 2021 13:43) (72 - 99)  BP: 131/76 (10 Mar 2021 13:43) (119/71 - 149/84)  BP(mean): --  RR: 18 (10 Mar 2021 05:31) (18 - 18)  SpO2: 92% (10 Mar 2021 10:41) (92% - 94%)    I&O's Summary    09 Mar 2021 07:01  -  10 Mar 2021 07:00  --------------------------------------------------------  IN: 0 mL / OUT: 400 mL / NET: -400 mL        CAPILLARY BLOOD GLUCOSE      POCT Blood Glucose.: 230 mg/dL (10 Mar 2021 13:03)  POCT Blood Glucose.: 99 mg/dL (10 Mar 2021 08:33)  POCT Blood Glucose.: 79 mg/dL (09 Mar 2021 21:05)  POCT Blood Glucose.: 147 mg/dL (09 Mar 2021 17:00)      PHYSICAL EXAM:     Constitutional: NAD, awake   HEENT: PERR, EOMI   Neck: Soft and supple, No LAD   Respiratory: Breath sounds are clear bilaterally, decreased breath sound noted on 3L NC   Cardiovascular: S1 and S2,    Gastrointestinal: Bowel Sounds present, soft, nontender, nondistended, no guarding, no rebound  Extremities: No peripheral edema  Vascular: 2+ peripheral pulses  Neurological: A/O x 3,   Skin: No rashes    MEDICATIONS:  MEDICATIONS  (STANDING):  amLODIPine   Tablet 10 milliGRAM(s) Oral daily  dexAMETHasone     Tablet 6 milliGRAM(s) Oral daily  dextrose 40% Gel 15 Gram(s) Oral once  dextrose 5%. 1000 milliLiter(s) (50 mL/Hr) IV Continuous <Continuous>  dextrose 5%. 1000 milliLiter(s) (100 mL/Hr) IV Continuous <Continuous>  dextrose 50% Injectable 25 Gram(s) IV Push once  dextrose 50% Injectable 12.5 Gram(s) IV Push once  dextrose 50% Injectable 25 Gram(s) IV Push once  enoxaparin Injectable 80 milliGRAM(s) SubCutaneous two times a day  glucagon  Injectable 1 milliGRAM(s) IntraMuscular once  hydrALAZINE 100 milliGRAM(s) Oral every 8 hours  hydrochlorothiazide 25 milliGRAM(s) Oral daily  insulin glargine Injectable (LANTUS) 16 Unit(s) SubCutaneous at bedtime  insulin lispro (ADMELOG) corrective regimen sliding scale   SubCutaneous at bedtime  insulin lispro (ADMELOG) corrective regimen sliding scale   SubCutaneous three times a day before meals  insulin lispro Injectable (ADMELOG) 4 Unit(s) SubCutaneous three times a day with meals  lisinopril 10 milliGRAM(s) Oral daily  metoprolol tartrate 25 milliGRAM(s) Oral two times a day  remdesivir  IVPB 100 milliGRAM(s) IV Intermittent every 24 hours      LABS: All Labs Reviewed:                        12.9   8.32  )-----------( 658      ( 09 Mar 2021 07:16 )             39.1     03-09    126<L>  |  91<L>  |  25.0<H>  ----------------------------<  97  3.6   |  18.0<L>  |  0.89    Ca    8.1<L>      09 Mar 2021 07:16    TPro  5.9<L>  /  Alb  2.9<L>  /  TBili  0.4  /  DBili  0.1  /  AST  35<H>  /  ALT  58<H>  /  AlkPhos  82  03-09    PT/INR - ( 09 Mar 2021 07:18 )   PT: 12.9 sec;   INR: 1.12 ratio               Blood Culture:     RADIOLOGY/EKG:    DVT PPX:    ADVANCED DIRECTIVE:    DISPOSITION:
[facility] Division of Hospital Medicine    Chief Complaint:  Patient is a 65y old  Female who presents with a chief complaint of covid19, hypoxia (04 Mar 2021 13:34)      SUBJECTIVE / OVERNIGHT EVENTS:  Patient was seen and examined at bedside. Chart reviewed. No acute overnight events. No current complaints.   denies chest pain, SOB, abd pain, N/V, fever, chills, dysuria or any other complaints.     MEDICATIONS  (STANDING):  amLODIPine   Tablet 10 milliGRAM(s) Oral daily  dexAMETHasone     Tablet 6 milliGRAM(s) Oral daily  dextrose 40% Gel 15 Gram(s) Oral once  dextrose 5%. 1000 milliLiter(s) (50 mL/Hr) IV Continuous <Continuous>  dextrose 5%. 1000 milliLiter(s) (100 mL/Hr) IV Continuous <Continuous>  dextrose 50% Injectable 25 Gram(s) IV Push once  dextrose 50% Injectable 12.5 Gram(s) IV Push once  dextrose 50% Injectable 25 Gram(s) IV Push once  enoxaparin Injectable 40 milliGRAM(s) SubCutaneous daily  glucagon  Injectable 1 milliGRAM(s) IntraMuscular once  hydrALAZINE 100 milliGRAM(s) Oral every 8 hours  hydrochlorothiazide 25 milliGRAM(s) Oral daily  insulin glargine Injectable (LANTUS) 10 Unit(s) SubCutaneous at bedtime  insulin lispro (ADMELOG) corrective regimen sliding scale   SubCutaneous three times a day before meals  insulin lispro (ADMELOG) corrective regimen sliding scale   SubCutaneous at bedtime  insulin lispro Injectable (ADMELOG) 4 Unit(s) SubCutaneous three times a day with meals  lisinopril 10 milliGRAM(s) Oral daily  metoprolol tartrate 25 milliGRAM(s) Oral two times a day  remdesivir  IVPB 100 milliGRAM(s) IV Intermittent every 24 hours  remdesivir  IVPB   IV Intermittent     MEDICATIONS  (PRN):  acetaminophen   Tablet .. 650 milliGRAM(s) Oral every 6 hours PRN Temp greater or equal to 38C (100.4F), Mild Pain (1 - 3), Moderate Pain (4 - 6)  hydrALAZINE Injectable 10 milliGRAM(s) IV Push every 6 hours PRN SBP > 150          I&O's Summary    04 Mar 2021 07:01  -  05 Mar 2021 07:00  --------------------------------------------------------  IN: 250 mL / OUT: 200 mL / NET: 50 mL        Vital Signs Last 24 Hrs  T(C): 37.2 (06 Mar 2021 05:39), Max: 37.2 (06 Mar 2021 05:39)  T(F): 98.9 (06 Mar 2021 05:39), Max: 98.9 (06 Mar 2021 05:39)  HR: 93 (06 Mar 2021 07:55) (83 - 105)  BP: 156/77 (06 Mar 2021 06:18) (156/77 - 190/94)  BP(mean): --  RR: 18 (06 Mar 2021 05:39) (18 - 18)  SpO2: 90% (06 Mar 2021 07:55) (90% - 93%)    Constitutional: NAD, In BED on NC  ENT: Supple, No JVD  Lungs: CTA B/L, Non-labored breathing  Cardio: RRR, S1/S2, No murmur  Abdomen: Soft, Nontender, Nondistended; Bowel sounds present  Extremities: No calf tenderness, No pitting edema  Musculoskeletal:   No clubbing or cyanosis of digits; no joint swelling or tenderness to palpation  Psych: A+O to person, place, and time; affect appropriate  Neuro: CN 2-12 are intact and symmetric; no gross sensory deficits;   Skin: No rashes; no palpable lesions    LABS:    CBC Full  -  ( 06 Mar 2021 09:05 )  WBC Count : 8.29 K/uL  RBC Count : 5.38 M/uL  Hemoglobin : 13.5 g/dL  Hematocrit : 41.9 %  Platelet Count - Automated : 603 K/uL  Mean Cell Volume : 77.9 fl  Mean Cell Hemoglobin : 25.1 pg  Mean Cell Hemoglobin Concentration : 32.2 gm/dL  Auto Neutrophil # : 6.39 K/uL  Auto Lymphocyte # : 0.87 K/uL  Auto Monocyte # : 0.54 K/uL  Auto Eosinophil # : 0.01 K/uL  Auto Basophil # : 0.03 K/uL  Auto Neutrophil % : 77.1 %  Auto Lymphocyte % : 10.5 %  Auto Monocyte % : 6.5 %  Auto Eosinophil % : 0.1 %  Auto Basophil % : 0.4 %                          12.9   6.96  )-----------( 515      ( 05 Mar 2021 08:17 )             39.9     03-05    135  |  99  |  25.0<H>  ----------------------------<  207<H>  4.4   |  18.0<L>  |  0.85    Ca    8.5<L>      05 Mar 2021 08:17    TPro  6.9  /  Alb  2.9<L>  /  TBili  0.4  /  DBili  0.2  /  AST  136<H>  /  ALT  97<H>  /  AlkPhos  108  03-05    PT/INR - ( 05 Mar 2021 08:17 )   PT: 11.3 sec;   INR: 0.97 ratio          CAPILLARY BLOOD GLUCOSE      POCT Blood Glucose.: 232 mg/dL (05 Mar 2021 08:33)  POCT Blood Glucose.: 210 mg/dL (04 Mar 2021 21:21)  POCT Blood Glucose.: 302 mg/dL (04 Mar 2021 16:22)  POCT Blood Glucose.: 267 mg/dL (04 Mar 2021 11:31)        RADIOLOGY REVIEWED  
[facility] Division of Hospital Medicine    Chief Complaint:  Patient is a 65y old  Female who presents with a chief complaint of covid19, hypoxia (04 Mar 2021 13:34)    SUBJECTIVE / OVERNIGHT EVENTS:  Patient was seen and examined at bedside. Chart reviewed. No acute overnight events. No current complaints.   denies chest pain, SOB, abd pain, N/V, fever, chills, dysuria or any other complaints.     MEDICATIONS  (STANDING):  amLODIPine   Tablet 10 milliGRAM(s) Oral daily  dexAMETHasone     Tablet 6 milliGRAM(s) Oral daily  dextrose 40% Gel 15 Gram(s) Oral once  dextrose 5%. 1000 milliLiter(s) (50 mL/Hr) IV Continuous <Continuous>  dextrose 5%. 1000 milliLiter(s) (100 mL/Hr) IV Continuous <Continuous>  dextrose 50% Injectable 25 Gram(s) IV Push once  dextrose 50% Injectable 12.5 Gram(s) IV Push once  dextrose 50% Injectable 25 Gram(s) IV Push once  enoxaparin Injectable 80 milliGRAM(s) SubCutaneous two times a day  glucagon  Injectable 1 milliGRAM(s) IntraMuscular once  hydrALAZINE 100 milliGRAM(s) Oral every 8 hours  hydrochlorothiazide 25 milliGRAM(s) Oral daily  insulin glargine Injectable (LANTUS) 16 Unit(s) SubCutaneous at bedtime  insulin lispro (ADMELOG) corrective regimen sliding scale   SubCutaneous three times a day before meals  insulin lispro (ADMELOG) corrective regimen sliding scale   SubCutaneous at bedtime  insulin lispro Injectable (ADMELOG) 4 Unit(s) SubCutaneous three times a day with meals  lisinopril 10 milliGRAM(s) Oral daily  metoprolol tartrate 25 milliGRAM(s) Oral two times a day  remdesivir  IVPB 100 milliGRAM(s) IV Intermittent every 24 hours    MEDICATIONS  (PRN):  acetaminophen   Tablet .. 650 milliGRAM(s) Oral every 6 hours PRN Temp greater or equal to 38C (100.4F), Mild Pain (1 - 3), Moderate Pain (4 - 6)  hydrALAZINE Injectable 10 milliGRAM(s) IV Push every 6 hours PRN SBP > 150        I&O's Summary    04 Mar 2021 07:01  -  05 Mar 2021 07:00  --------------------------------------------------------  IN: 250 mL / OUT: 200 mL / NET: 50 mL    Vital Signs Last 24 Hrs  T(C): 36.7 (07 Mar 2021 09:00), Max: 36.9 (06 Mar 2021 16:45)  T(F): 98 (07 Mar 2021 09:00), Max: 98.5 (06 Mar 2021 16:45)  HR: 70 (07 Mar 2021 09:00) (70 - 94)  BP: 136/80 (07 Mar 2021 09:00) (136/80 - 166/89)  BP(mean): --  RR: 19 (07 Mar 2021 09:00) (18 - 20)  SpO2: 94% (07 Mar 2021 09:00) (92% - 95%)    Constitutional: NAD, In BED on NC  ENT: Supple, No JVD  Lungs: CTA B/L, Non-labored breathing  Cardio: RRR, S1/S2, No murmur  Abdomen: Soft, Nontender, Nondistended; Bowel sounds present  Extremities: No calf tenderness, No pitting edema  Musculoskeletal:   No clubbing or cyanosis of digits; no joint swelling or tenderness to palpation  Psych: A+O to person, place, and time; affect appropriate  Neuro: CN 2-12 are intact and symmetric; no gross sensory deficits;   Skin: No rashes; no palpable lesions    LABS:    CBC Full  -  ( 06 Mar 2021 09:05 )  WBC Count : 8.29 K/uL  RBC Count : 5.38 M/uL  Hemoglobin : 13.5 g/dL  Hematocrit : 41.9 %  Platelet Count - Automated : 603 K/uL  Mean Cell Volume : 77.9 fl  Mean Cell Hemoglobin : 25.1 pg  Mean Cell Hemoglobin Concentration : 32.2 gm/dL  Auto Neutrophil # : 6.39 K/uL  Auto Lymphocyte # : 0.87 K/uL  Auto Monocyte # : 0.54 K/uL  Auto Eosinophil # : 0.01 K/uL  Auto Basophil # : 0.03 K/uL  Auto Neutrophil % : 77.1 %  Auto Lymphocyte % : 10.5 %  Auto Monocyte % : 6.5 %  Auto Eosinophil % : 0.1 %  Auto Basophil % : 0.4 %                          12.9   6.96  )-----------( 515      ( 05 Mar 2021 08:17 )             39.9     03-05    135  |  99  |  25.0<H>  ----------------------------<  207<H>  4.4   |  18.0<L>  |  0.85    Ca    8.5<L>      05 Mar 2021 08:17    TPro  6.9  /  Alb  2.9<L>  /  TBili  0.4  /  DBili  0.2  /  AST  136<H>  /  ALT  97<H>  /  AlkPhos  108  03-05    PT/INR - ( 05 Mar 2021 08:17 )   PT: 11.3 sec;   INR: 0.97 ratio          CAPILLARY BLOOD GLUCOSE      POCT Blood Glucose.: 232 mg/dL (05 Mar 2021 08:33)  POCT Blood Glucose.: 210 mg/dL (04 Mar 2021 21:21)  POCT Blood Glucose.: 302 mg/dL (04 Mar 2021 16:22)  POCT Blood Glucose.: 267 mg/dL (04 Mar 2021 11:31)        RADIOLOGY REVIEWED  
[facility] Division of Hospital Medicine    Chief Complaint:  Patient is a 65y old  Female who presents with a chief complaint of covid19, hypoxia (2021 23:07)    Overnight: patient desated to 80s, placed on 3L with improvement. Discharge cancelled.     Patient was seen and examined at bedside early this morning  Patient with no specific complaints, does not feel more short of breath  However pt is desating on 5L NC to 86% (new for patient as patient previously was not hypoxic and not on oxygen). Pt was placed on NRB at bedside with improvement to 93%  Denies other medical complaints  Patient denies SOB, abd pain, N/V, fever, chills, dysuria or any other complaints  All remainder ROS negative  VSS now on NRB    Vital Signs Last 24 Hrs  T(C): 38.4 (02 Mar 2021 05:43), Max: 39.3 (01 Mar 2021 16:46)  T(F): 101.2 (02 Mar 2021 05:43), Max: 102.8 (01 Mar 2021 16:46)  HR: 99 (02 Mar 2021 04:32) (92 - 107)  BP: 166/62 (02 Mar 2021 04:48) (153/80 - 176/92)  BP(mean): --  RR: 18 (02 Mar 2021 05:43) (18 - 20)  SpO2: 92% (02 Mar 2021 05:43) (85% - 97%) on NRB    PHYSICAL EXAM:  Constitutional: NAD   ENT: MMM, no thrush, Supple, No JVD  Lungs: Clear to auscultation bilaterally, good air entry, non-labored breathing  Cardio: RRR, S1/S2, No murmur  Abdomen: Soft, Nontender, Nondistended; Bowel sounds present  Extremities: No calf tenderness, No pitting edema  Musculoskeletal:   No clubbing or cyanosis of digits; no joint swelling or tenderness to palpation  Psych: A+O to person, place, and time; affect appropriate  Neuro: CN 2-12 are intact and symmetric; no gross sensory deficits;       LABS: REVIEWED    Urinalysis Basic - ( 2021 20:30 )  Color: Yellow / Appearance: Clear / S.015 / pH: x  Gluc: x / Ketone: Moderate  / Bili: Negative / Urobili: Negative mg/dL   Blood: x / Protein: 500 mg/dL / Nitrite: Negative   Leuk Esterase: Small / RBC: 3-5 /HPF / WBC 6-10   Sq Epi: x / Non Sq Epi: Moderate / Bacteria: Few        
HPI  Pt is a 64yo F admitted to [facility] for hypoxia secondary to covid  Pt was seen and examined at bedside. Tmax of 102.4 noted. Pt states she still have cough, but no SOB.     Vital Signs Last 24 Hrs  T(C): 36.7 (08 Mar 2021 10:52), Max: 39.1 (08 Mar 2021 06:48)  T(F): 98 (08 Mar 2021 10:52), Max: 102.4 (08 Mar 2021 06:48)  HR: 83 (08 Mar 2021 10:52) (70 - 96)  BP: 121/74 (08 Mar 2021 10:52) (121/74 - 158/82)  BP(mean): --  RR: 18 (08 Mar 2021 10:52) (18 - 18)  SpO2: 94% (08 Mar 2021 10:52) (92% - 94%)    I&O's Summary    07 Mar 2021 07:01  -  08 Mar 2021 07:00  --------------------------------------------------------  IN: 0 mL / OUT: 200 mL / NET: -200 mL        CAPILLARY BLOOD GLUCOSE      POCT Blood Glucose.: 331 mg/dL (08 Mar 2021 12:33)  POCT Blood Glucose.: 202 mg/dL (08 Mar 2021 08:49)  POCT Blood Glucose.: 143 mg/dL (07 Mar 2021 21:24)  POCT Blood Glucose.: 176 mg/dL (07 Mar 2021 17:54)      PHYSICAL EXAM:    Constitutional: NAD, awake   HEENT: PERR, EOMI   Neck: Soft and supple, No LAD   Respiratory: Breath sounds are clear bilaterally, decreased breath sound noted   Cardiovascular: S1 and S2,    Gastrointestinal: Bowel Sounds present, soft, nontender, nondistended, no guarding, no rebound  Extremities: No peripheral edema  Vascular: 2+ peripheral pulses  Neurological: A/O x 3,   Skin: No rashes    MEDICATIONS:  MEDICATIONS  (STANDING):  amLODIPine   Tablet 10 milliGRAM(s) Oral daily  dexAMETHasone     Tablet 6 milliGRAM(s) Oral daily  dextrose 40% Gel 15 Gram(s) Oral once  dextrose 5%. 1000 milliLiter(s) (50 mL/Hr) IV Continuous <Continuous>  dextrose 5%. 1000 milliLiter(s) (100 mL/Hr) IV Continuous <Continuous>  dextrose 50% Injectable 25 Gram(s) IV Push once  dextrose 50% Injectable 12.5 Gram(s) IV Push once  dextrose 50% Injectable 25 Gram(s) IV Push once  enoxaparin Injectable 80 milliGRAM(s) SubCutaneous two times a day  glucagon  Injectable 1 milliGRAM(s) IntraMuscular once  hydrALAZINE 100 milliGRAM(s) Oral every 8 hours  hydrochlorothiazide 25 milliGRAM(s) Oral daily  insulin glargine Injectable (LANTUS) 16 Unit(s) SubCutaneous at bedtime  insulin lispro (ADMELOG) corrective regimen sliding scale   SubCutaneous three times a day before meals  insulin lispro (ADMELOG) corrective regimen sliding scale   SubCutaneous at bedtime  insulin lispro Injectable (ADMELOG) 4 Unit(s) SubCutaneous three times a day with meals  lisinopril 10 milliGRAM(s) Oral daily  metoprolol tartrate 25 milliGRAM(s) Oral two times a day  remdesivir  IVPB 100 milliGRAM(s) IV Intermittent every 24 hours      LABS: All Labs Reviewed:                        13.6   8.18  )-----------( 686      ( 08 Mar 2021 07:09 )             42.4     03-08    x   |  x   |  x   ----------------------------<  x   x    |  x   |  0.91    Ca    8.5<L>      07 Mar 2021 05:18  Phos  4.2     03-07  Mg     2.1     03-07    TPro  6.5<L>  /  Alb  3.1<L>  /  TBili  0.4  /  DBili  0.1  /  AST  35<H>  /  ALT  71<H>  /  AlkPhos  99  03-08    PT/INR - ( 08 Mar 2021 07:09 )   PT: 13.2 sec;   INR: 1.15 ratio               Blood Culture:     RADIOLOGY/EKG:    DVT PPX:    ADVANCED DIRECTIVE:    DISPOSITION:
HPI  Pt is a 66yo F admitted to [facility] for hypoxia secondary to covid  Pt was seen and examined at bedside. Tmax 99.4 noted. Pt is titrate down from 6 to 3L NC, and states she is feeling better     Vital Signs Last 24 Hrs  T(C): 36.7 (10 Mar 2021 10:41), Max: 37.4 (09 Mar 2021 20:59)  T(F): 98 (10 Mar 2021 10:41), Max: 99.4 (09 Mar 2021 20:59)  HR: 80 (10 Mar 2021 13:43) (72 - 99)  BP: 131/76 (10 Mar 2021 13:43) (119/71 - 149/84)  BP(mean): --  RR: 18 (10 Mar 2021 05:31) (18 - 18)  SpO2: 92% (10 Mar 2021 10:41) (92% - 94%)    I&O's Summary    09 Mar 2021 07:01  -  10 Mar 2021 07:00  --------------------------------------------------------  IN: 0 mL / OUT: 400 mL / NET: -400 mL        CAPILLARY BLOOD GLUCOSE      POCT Blood Glucose.: 230 mg/dL (10 Mar 2021 13:03)  POCT Blood Glucose.: 99 mg/dL (10 Mar 2021 08:33)  POCT Blood Glucose.: 79 mg/dL (09 Mar 2021 21:05)  POCT Blood Glucose.: 147 mg/dL (09 Mar 2021 17:00)      PHYSICAL EXAM:    Constitutional: NAD, awake   HEENT: PERR, EOMI   Neck: Soft and supple, No LAD   Respiratory: Breath sounds are clear bilaterally, decreased breath sound noted   Cardiovascular: S1 and S2,    Gastrointestinal: Bowel Sounds present, soft, nontender, nondistended, no guarding, no rebound  Extremities: No peripheral edema  Vascular: 2+ peripheral pulses  Neurological: A/O x 3,   Skin: No rashes    MEDICATIONS:  MEDICATIONS  (STANDING):  amLODIPine   Tablet 10 milliGRAM(s) Oral daily  dexAMETHasone     Tablet 6 milliGRAM(s) Oral daily  dextrose 40% Gel 15 Gram(s) Oral once  dextrose 5%. 1000 milliLiter(s) (50 mL/Hr) IV Continuous <Continuous>  dextrose 5%. 1000 milliLiter(s) (100 mL/Hr) IV Continuous <Continuous>  dextrose 50% Injectable 25 Gram(s) IV Push once  dextrose 50% Injectable 12.5 Gram(s) IV Push once  dextrose 50% Injectable 25 Gram(s) IV Push once  enoxaparin Injectable 80 milliGRAM(s) SubCutaneous two times a day  glucagon  Injectable 1 milliGRAM(s) IntraMuscular once  hydrALAZINE 100 milliGRAM(s) Oral every 8 hours  hydrochlorothiazide 25 milliGRAM(s) Oral daily  insulin glargine Injectable (LANTUS) 16 Unit(s) SubCutaneous at bedtime  insulin lispro (ADMELOG) corrective regimen sliding scale   SubCutaneous at bedtime  insulin lispro (ADMELOG) corrective regimen sliding scale   SubCutaneous three times a day before meals  insulin lispro Injectable (ADMELOG) 4 Unit(s) SubCutaneous three times a day with meals  lisinopril 10 milliGRAM(s) Oral daily  metoprolol tartrate 25 milliGRAM(s) Oral two times a day  remdesivir  IVPB 100 milliGRAM(s) IV Intermittent every 24 hours      LABS: All Labs Reviewed:                        12.9   8.32  )-----------( 658      ( 09 Mar 2021 07:16 )             39.1     03-09    126<L>  |  91<L>  |  25.0<H>  ----------------------------<  97  3.6   |  18.0<L>  |  0.89    Ca    8.1<L>      09 Mar 2021 07:16    TPro  5.9<L>  /  Alb  2.9<L>  /  TBili  0.4  /  DBili  0.1  /  AST  35<H>  /  ALT  58<H>  /  AlkPhos  82  03-09    PT/INR - ( 09 Mar 2021 07:18 )   PT: 12.9 sec;   INR: 1.12 ratio               Blood Culture:     RADIOLOGY/EKG:    DVT PPX:    ADVANCED DIRECTIVE:    DISPOSITION:

## 2021-03-10 NOTE — PROGRESS NOTE ADULT - REASON FOR ADMISSION
covid19, hypoxia

## 2021-03-10 NOTE — PROGRESS NOTE ADULT - PROVIDER SPECIALTY LIST ADULT
Hospitalist
Infectious Disease
Hospitalist
Infectious Disease
Infectious Disease
Hospitalist
Hospitalist
Infectious Disease
Hospitalist
Hospitalist
Infectious Disease

## 2021-05-26 ENCOUNTER — APPOINTMENT (OUTPATIENT)
Dept: DISASTER EMERGENCY | Facility: OTHER | Age: 66
End: 2021-05-26

## 2021-06-03 PROCEDURE — 97116 GAIT TRAINING THERAPY: CPT

## 2021-06-03 PROCEDURE — 97163 PT EVAL HIGH COMPLEX 45 MIN: CPT

## 2021-06-03 PROCEDURE — 0225U NFCT DS DNA&RNA 21 SARSCOV2: CPT

## 2021-06-03 PROCEDURE — 86850 RBC ANTIBODY SCREEN: CPT

## 2021-06-03 PROCEDURE — 86901 BLOOD TYPING SEROLOGIC RH(D): CPT

## 2021-06-03 PROCEDURE — 82565 ASSAY OF CREATININE: CPT

## 2021-06-03 PROCEDURE — 93005 ELECTROCARDIOGRAM TRACING: CPT

## 2021-06-03 PROCEDURE — 85018 HEMOGLOBIN: CPT

## 2021-06-03 PROCEDURE — 85027 COMPLETE CBC AUTOMATED: CPT

## 2021-06-03 PROCEDURE — 83615 LACTATE (LD) (LDH) ENZYME: CPT

## 2021-06-03 PROCEDURE — 80053 COMPREHEN METABOLIC PANEL: CPT

## 2021-06-03 PROCEDURE — 87186 SC STD MICRODIL/AGAR DIL: CPT

## 2021-06-03 PROCEDURE — 80048 BASIC METABOLIC PNL TOTAL CA: CPT

## 2021-06-03 PROCEDURE — 85379 FIBRIN DEGRADATION QUANT: CPT

## 2021-06-03 PROCEDURE — 82728 ASSAY OF FERRITIN: CPT

## 2021-06-03 PROCEDURE — 71275 CT ANGIOGRAPHY CHEST: CPT

## 2021-06-03 PROCEDURE — 86900 BLOOD TYPING SEROLOGIC ABO: CPT

## 2021-06-03 PROCEDURE — 36430 TRANSFUSION BLD/BLD COMPNT: CPT

## 2021-06-03 PROCEDURE — 84484 ASSAY OF TROPONIN QUANT: CPT

## 2021-06-03 PROCEDURE — 82947 ASSAY GLUCOSE BLOOD QUANT: CPT

## 2021-06-03 PROCEDURE — 83605 ASSAY OF LACTIC ACID: CPT

## 2021-06-03 PROCEDURE — 83735 ASSAY OF MAGNESIUM: CPT

## 2021-06-03 PROCEDURE — 76775 US EXAM ABDO BACK WALL LIM: CPT

## 2021-06-03 PROCEDURE — 93975 VASCULAR STUDY: CPT

## 2021-06-03 PROCEDURE — 96360 HYDRATION IV INFUSION INIT: CPT

## 2021-06-03 PROCEDURE — 99285 EMERGENCY DEPT VISIT HI MDM: CPT | Mod: 25

## 2021-06-03 PROCEDURE — 82330 ASSAY OF CALCIUM: CPT

## 2021-06-03 PROCEDURE — 84295 ASSAY OF SERUM SODIUM: CPT

## 2021-06-03 PROCEDURE — 71045 X-RAY EXAM CHEST 1 VIEW: CPT

## 2021-06-03 PROCEDURE — 87040 BLOOD CULTURE FOR BACTERIA: CPT

## 2021-06-03 PROCEDURE — 94760 N-INVAS EAR/PLS OXIMETRY 1: CPT

## 2021-06-03 PROCEDURE — 85610 PROTHROMBIN TIME: CPT

## 2021-06-03 PROCEDURE — 84145 PROCALCITONIN (PCT): CPT

## 2021-06-03 PROCEDURE — 82803 BLOOD GASES ANY COMBINATION: CPT

## 2021-06-03 PROCEDURE — U0003: CPT

## 2021-06-03 PROCEDURE — 97530 THERAPEUTIC ACTIVITIES: CPT

## 2021-06-03 PROCEDURE — 80076 HEPATIC FUNCTION PANEL: CPT

## 2021-06-03 PROCEDURE — 82962 GLUCOSE BLOOD TEST: CPT

## 2021-06-03 PROCEDURE — 93970 EXTREMITY STUDY: CPT

## 2021-06-03 PROCEDURE — 36415 COLL VENOUS BLD VENIPUNCTURE: CPT

## 2021-06-03 PROCEDURE — 82435 ASSAY OF BLOOD CHLORIDE: CPT

## 2021-06-03 PROCEDURE — 81001 URINALYSIS AUTO W/SCOPE: CPT

## 2021-06-03 PROCEDURE — U0005: CPT

## 2021-06-03 PROCEDURE — 85025 COMPLETE CBC W/AUTO DIFF WBC: CPT

## 2021-06-03 PROCEDURE — 84100 ASSAY OF PHOSPHORUS: CPT

## 2021-06-03 PROCEDURE — 84132 ASSAY OF SERUM POTASSIUM: CPT

## 2021-06-03 PROCEDURE — 86140 C-REACTIVE PROTEIN: CPT

## 2021-06-03 PROCEDURE — 85014 HEMATOCRIT: CPT

## 2021-09-09 NOTE — PATIENT PROFILE ADULT - NSPROPOAPRESSUREINJURY_GEN_A_NUR
Operative Report    Preoperative Diagnosis:  Trigger ring finger of right hand [M65.341]  Carpal tunnel syndrome, bilateral [G56.03]    Findings/Postoperative Diagnosis: Same    Procedure: Procedure(s):  right endoscopic carpal tunnel release with right ring finger A1 pulley release    Specimen: No specimens collected    Implants:  * No implants in log *    Estimated Blood Loss: Minimal    Complications: none    Patient Status: stable    Anesthesia: General    Surgeon: Charanjit Alonso MD    Assistant Surgeon: n/a    Indication for Procedure:    This is a 67-year-old female with complaints of numbness and tingling in both upper extremities, consistent with carpal tunnel syndrome, in addition to right ring finger triggering.  Risk, benefits, as well as alternative treatment options were discussed and she understand that there are no guarantees with surgery.    Operative Technique:      After obtaining informed consent and marking of the extremity, she was transported to the operative suite where she was placed supine on a radiolucent table with an armboard.  A general anesthetic was administered by anesthesiology, as a padded upper extremity tourniquet was placed on the right arm.  The right upper extremity was prepped and draped in the usual sterile fashion.  Landmarks for endoscopic carpal tunnel release including the flexor carpi ulnaris, flexor carpi radialis and palmaris longus tendons were drawn with a marking pen.  I then identified the Pisa form as well as the second metacarpal.  A curvilinear incision was drawn in on the ring finger metacarpal phalangeal joint and intersecting lines were connected to estimate the position of the hook of the hamate and distal extent of the transverse carpal ligament.  The limb was exsanguinated and the tourniquet was inflated.  A transverse incision just proximal to the wrist flexion crease centered between the flexor carpi ulnaris and palmaris longus tendons was carried  through skin sharply with a scalpel.  Blunt dissection was carried through the subcutaneous tissue.  The forearm fascia was identified and split in line with our skin incision.  Proximal fascia was split in line with the forearm, while the distal fascia was retracted to gain access to the carpal tunnel.  The carpal tunnel was then sequentially dilated up to the size of the endoscope, starting with the hook of the hamate finders.  A synovial elevator was used to remove the synovial tissue from the undersurface of the transverse carpal ligament.  The endoscope was then gently guided into the carpal tunnel.  Excellent visualization of the distal fat pad and transverse carpal ligament was obtained, prior to deploying the endoscopic knife blade at the distal extent of the transverse carpal ligament.  The transverse carpal ligament was incised under direct endoscopic visualization, for the length of the carpal tunnel.  Final endoscopic images of the incised transverse carpal ligament were obtained, prior to removal of the endoscope.  We then turned our attention to the ring finger trigger.  The curvilinear incision within the metacarpal phalangeal joint flexion crease was carried through skin sharply with a scalpel.  Blunt dissection was carried down to the flexor tendon sheath.  Ragnell retractors were used to protect the neurovascular bundles on either side of the flexor tendon sheath.  The A1 pulley was identified and split in line with the flexor tendons.  A hemostat was used to pull the flexor tendons up out of the wound and perform a traction tenolysis.  Wounds were then copiously irrigated and the wrist incision was reapproximated using interrupted 3-0 Vicryl subcutaneous suture.  Skin glue was applied to the wrist incision and the palmar incision was reapproximated using interrupted 4-0 Chromic Gut suture.  Local anesthetic was given to both surgical sites, prior to placement of a sterile dressing consisting of 4  x 4 gauze Sean and Ace wrap.  The tourniquet was deflated and the drapes were taken down.  She was then repositioned for anesthetic management and helped off the operative table, onto a stretcher.  She was taken to the postanesthesia care unit in stable condition.  She will be discharged home today with both written and verbal instructions, as well as a prescription for narcotic pain medication.  She may remove her bandages in 2 to 3 days time and shower.  She will follow up in 2 weeks time for a wound check.    Charanjit Alonso MD   2:50 PM  9/9/2021   no

## 2021-12-06 NOTE — PROGRESS NOTE ADULT - ASSESSMENT
OVERNIGHT EVENTS:   Patient continues to refused overnight Lantus. Kept NPO for R pigtail placement.     SUBJECTIVE:  Patient seen and examined at bedside, resting comfortably in bed, and does not appear to be in any acute distress. Patient denies any recent fevers, chills, nausea, vomiting, headache, acute sob, chest pain, abdominal pain, genitourinary sx, extremity pain or swelling.    VITAL SIGNS:  Vital Signs Last 24 Hrs  T(C): 36.6 (06 Dec 2021 12:33), Max: 36.7 (05 Dec 2021 14:15)  T(F): 97.8 (06 Dec 2021 12:33), Max: 98 (05 Dec 2021 14:15)  HR: 72 (06 Dec 2021 12:33) (63 - 74)  BP: 145/71 (06 Dec 2021 12:33) (135/78 - 158/78)  BP(mean): --  RR: 17 (06 Dec 2021 12:33) (17 - 18)  SpO2: 94% (06 Dec 2021 12:33) (93% - 96%)    PHYSICAL EXAM:  General: NAD; speaking in full sentences  HEENT: NC/AT; PERRL; EOMI; MMM  Neck: supple; no JVD  Cardiac: RRR; +S1/S2  Pulm: Left side CTA; no W/R/R, dec. breath sounds R lung  GI: soft, +distended, tympanic, NT, +BSx4  Extremities: WWP; + lower ext 3+ pitting edema from ankle to knee  Vasc: 2+ radial, DP pulses B/L  Neuro: AAOx3; no focal deficits    MEDICATIONS:  MEDICATIONS  (STANDING):  amLODIPine   Tablet 10 milliGRAM(s) Oral daily  atorvastatin 40 milliGRAM(s) Oral at bedtime  dextrose 40% Gel 15 Gram(s) Oral once  dextrose 5%. 1000 milliLiter(s) (50 mL/Hr) IV Continuous <Continuous>  dextrose 5%. 1000 milliLiter(s) (100 mL/Hr) IV Continuous <Continuous>  dextrose 50% Injectable 25 Gram(s) IV Push once  dextrose 50% Injectable 12.5 Gram(s) IV Push once  dextrose 50% Injectable 25 Gram(s) IV Push once  furosemide   Injectable 40 milliGRAM(s) IV Push every 12 hours  glucagon  Injectable 1 milliGRAM(s) IntraMuscular once  insulin glargine Injectable (LANTUS) 10 Unit(s) SubCutaneous at bedtime  insulin lispro (ADMELOG) corrective regimen sliding scale   SubCutaneous Before meals and at bedtime  pantoprazole    Tablet 40 milliGRAM(s) Oral before breakfast  tacrolimus 2 milliGRAM(s) Oral <User Schedule>  tacrolimus 1 milliGRAM(s) Oral <User Schedule>  tamsulosin 0.4 milliGRAM(s) Oral at bedtime  urea Oral Powder 15 Gram(s) Oral two times a day  valGANciclovir 450 milliGRAM(s) Oral every 24 hours    MEDICATIONS  (PRN):      ALLERGIES:  Allergies    No Known Allergies    Intolerances        LABS:                        8.7    16.14 )-----------( 134      ( 06 Dec 2021 06:47 )             29.5     12-06    128<L>  |  92<L>  |  35<H>  ----------------------------<  145<H>  4.6   |  25  |  1.21    Ca    9.3      06 Dec 2021 06:47  Phos  4.3     12-06  Mg     1.9     12-06    TPro  7.5  /  Alb  4.3  /  TBili  0.6  /  DBili  x   /  AST  15  /  ALT  8<L>  /  AlkPhos  136<H>  12-05    PT/INR - ( 05 Dec 2021 10:57 )   PT: 15.6 sec;   INR: 1.32          PTT - ( 05 Dec 2021 10:57 )  PTT:41.7 sec    RADIOLOGY & ADDITIONAL TESTS: Reviewed. 65F hx HTN, prediabetes p/w fever, cough and body aches found to be hypoxic 2/2 covid19.    #Covid  - Pt currently on 15L NRB satting at 92%  - S/p convalescent plasma (3/2)  - Repeat inflammatory markers show acute increased ddimer (1746 3/2 to 1814 3/3).   - Chest CT negative for PE and LE US negative for DVT as of 3/2  - ID recs appreciated- C/w Dexamethasone and remdesivir (day 2/5), titrate O2 as tolerated, encourage proning, OOB, and IS  - Pt with elevated temps 2/2 covid. Infectious work up insignificant, Bcx negative, Ucx w/ GNR 10-50K  - C/w Tylenol PRN  - Monitor/  - Supportive care  - monitor CBC with diff and CMP daily    #Hypertensive urgency  - S/p lasix 20mg IV x 1 (2/28)  - Started hydralazine 25mg q8 hours  - C/w lisinopril to 10mg   - C/w Norvasc 10mg  - Hydralazine 10 mg PRN for SBP >150    #Hyponatremia   -Resolved  -Likely 2/2 decreased po intake  -S/p 2L in ER    #Abnormal UA  - Likely contaminant given moderate epithelials  - Patient denies urinary symptoms  - UCx w/ 10-50K GNR  - Abx not necessary at this time    #DVT ppx- half dose lovenox BID    #goc- full code    DISPO: Pending clinical improvement    65F hx HTN, prediabetes p/w fever, cough and body aches found to be hypoxic 2/2 covid19.    #Covid  - Pt currently on 15L NRB satting at 92%  - S/p convalescent plasma (3/2)  - Repeat inflammatory markers show acute increased ddimer (1746 3/2 to 1814 3/3).   - Chest CT negative for PE and LE US negative for DVT as of 3/2  - ID recs appreciated- C/w Dexamethasone and remdesivir (day 2/5), titrate O2 as tolerated, encourage proning, OOB, and IS  - Pt with elevated temps 2/2 covid. Infectious work up insignificant, Bcx negative, Ucx w/ GNR 10-50K  - C/w Tylenol PRN  - Monitor/  - Supportive care  - monitor CBC with diff and CMP daily    #Hypertensive urgency  - S/p lasix 20mg IV x 1 (2/28)  - Started hydralazine 25mg q8 hours  - C/w lisinopril to 10mg   - C/w Norvasc 10mg  - Hydralazine 10 mg PRN for SBP >150    #Hyponatremia   - 132 today  -Likely 2/2 decreased po intake  - S/p 2L in ER    #Abnormal UA  - Likely contaminant given moderate epithelials  - Patient denies urinary symptoms  - UCx w/ 10-50K GNR  - Abx not necessary at this time    #DVT ppx- Lovenox 40mg QD    #goc- full code    DISPO: Pending clinical improvement    65F hx HTN, prediabetes p/w fever, cough and body aches found to be hypoxic 2/2 covid19.    Covid  - Pt currently on 15L NRB satting at 92%  - S/p convalescent plasma (3/2)  - Initiated Tocilizumab today   - Inflammatory markers reviewed, continue to trend  - Chest CT negative for PE and LE US negative for DVT as of 3/2  - ID recs appreciated- C/w Dexamethasone and remdesivir (day 2/5), titrate O2 as tolerated, encourage proning, OOB, and IS  - Pt with elevated temps 2/2 covid. Infectious work up insignificant, Bcx negative, Ucx w/ GNR 10-50K  - C/w Tylenol PRN  - Monitor/  - Supportive care  - Monitor CBC with diff and CMP daily    Hypertensive urgency  - S/p lasix 20mg IV x 1 (2/28)  - Started hydralazine 25mg q8 hours  - C/w lisinopril to 10mg   - C/w Norvasc 10mg  - Hydralazine 10 mg PRN for SBP >150    Hyponatremia   - 132 today  - 500cc bolus NS, received 2L in ER with benefit   - Likely 2/2 decreased po intake  - Trend BMP    Abnormal UA  - Likely contaminant given moderate epithelials  - Patient denies urinary symptoms  - UCx w/ 10-50K GNR  - Abx not necessary at this time    DVT ppx- Lovenox 40mg QD    #goc- full code    DISPO: Pending clinical improvement    65F hx HTN, prediabetes p/w fever, cough and body aches found to be hypoxic 2/2 covid19.    Covid  - Pt currently on 15L NRB satting at 92%  - S/p convalescent plasma (3/2)  - Initiated Tocilizumab today   - Inflammatory markers reviewed, continue to trend  - Chest CT negative for PE and LE US negative for DVT as of 3/2  - ID recs appreciated- C/w Dexamethasone and remdesivir (day 2/5), titrate O2 as tolerated, encourage proning, OOB, and IS  - Pt with elevated temps 2/2 covid. Infectious work up insignificant, Bcx negative, Ucx w/ GNR 10-50K  - C/w Tylenol PRN  - Monitor/  - Supportive care  - Monitor CBC with diff and CMP daily    Hypertensive urgency  - S/p lasix 20mg IV x 1 (2/28)  - Started hydralazine 25mg q8 hours  - C/w lisinopril to 10mg   - C/w Norvasc 10mg  - Hydralazine 10 mg PRN for SBP >150    Hyponatremia   - 132 today  - Received 2L NS in ED  - Likely 2/2 decreased po intake  - Trend BMP    Abnormal UA  - Likely contaminant given moderate epithelials  - Patient denies urinary symptoms  - UCx w/ 10-50K GNR  - Abx not necessary at this time    DVT ppx- Lovenox 40mg QD    #goc- full code    DISPO: Pending clinical improvement    65F hx HTN, prediabetes p/w fever, cough and body aches found to be hypoxic 2/2 covid19.    Covid  - Pt currently on 15L NRB satting at 92%  - S/p convalescent plasma (3/2)  - Initiated Tocilizumab today   - Inflammatory markers reviewed, continue to trend  - Chest CT negative for PE and LE US negative for DVT as of 3/2  - ID recs appreciated- C/w Dexamethasone and remdesivir (day 2/5), titrate O2 as tolerated, encourage proning, OOB, and IS  - Pt with elevated temps 2/2 covid. Infectious work up insignificant, Bcx negative, Ucx w/ GNR 10-50K  - C/w Tylenol PRN  - Monitor/  - Supportive care  - Monitor CBC with diff and CMP daily    Hypertensive urgency  - S/p lasix 20mg IV x 1 (2/28)  - Started hydralazine 25mg q8 hours  - C/w lisinopril to 10mg   - C/w Norvasc 10mg  - Hydralazine 10 mg PRN for SBP >150    Hyponatremia   - 132 today  - Received 2L NS in ED  - Likely 2/2 decreased po intake; encouraged PO intake   - Trend BMP    Abnormal UA  - Likely contaminant given moderate epithelials  - Patient denies urinary symptoms  - UCx w/ 10-50K GNR  - Abx not necessary at this time    DVT ppx- Lovenox 40mg QD    #goc- full code    DISPO: Pending clinical improvement

## 2024-11-04 NOTE — ED PROVIDER NOTE - NS ED ATTENDING STATEMENT MOD
Winlevi Pregnancy And Lactation Text: This medication is considered safe during pregnancy and breastfeeding. I have personally seen and examined this patient.  I have fully participated in the care of this patient. I have reviewed all pertinent clinical information, including history, physical exam, plan and the Resident’s note and agree except as noted.
